# Patient Record
Sex: FEMALE | Race: WHITE | NOT HISPANIC OR LATINO | Employment: OTHER | URBAN - METROPOLITAN AREA
[De-identification: names, ages, dates, MRNs, and addresses within clinical notes are randomized per-mention and may not be internally consistent; named-entity substitution may affect disease eponyms.]

---

## 2017-01-03 NOTE — PATIENT INSTRUCTIONS

## 2017-01-03 NOTE — PROGRESS NOTES
SUBJECTIVE:                                                            Amada Hurd is a 76 year old female who presents for Preventive Visit  Are you in the first 12 months of your Medicare coverage?  No    Physical  Annual:     Getting at least 3 servings of Calcium per day::  Yes    Bi-annual eye exam::  Yes    Dental care twice a year::  Yes    Sleep apnea or symptoms of sleep apnea::  None    Diet::  Regular (no restrictions)    Frequency of exercise::  2-3 days/week    Duration of exercise::  30-45 minutes    Taking medications regularly::  Yes    Medication side effects::  None    Additional concerns today::  No      Medical letter of need- needs letter to use health care funds for massage and chiropractic care...  History- Broke sacral area many yrs ago, her 30s?  While skiing, chair-lift hit her in the back.    Fracture, no surgery for it, wasn't able to do any treatment to the area, just let it heal.  [facility]- regular doctor there, unsure if she saw a specialist.  Has had pain in that area since then, and has found it is best managed with regular massage and chiro for flares.  Does massage- 2x/month.  Chiro- as needed for flares.  Does yoga, stretching and massage to maintain.  Doing this for many years - best that she's found to lessen pain and keep active.  Did try PT - didn't really help.    Had dexa done- reviewed results- see TE discussion.  Will do mammogram- scheduled soon.  UTC on colonoscopy, pneumonia and zoster shots.  Had flu shot done.    Lipids- done over a year ago.  Can return fasting.    Patient Active Problem List    Diagnosis Date Noted     Raynaud disease      Priority: Medium     Menopausal syndrome (hot flashes) 10/19/2016     Priority: Medium     HRT txt with estrodiol patch- went on it many yrs ago (? After hysterectomy)- for hot flashes, depression, foggy brain- helped a lot.  Lowered dose likely in 10/14 when she met Dr. Jose.  Did have a hard time at first after  lowering dose, but has adjusted and is fine with this dose now.  Has tried off, for a couple wks- loses her energy, depressed when off the hormone.  Understands risks- would like to stay on the patch (1/17- refilled for a year).       Advance Care Planning 02/29/2016     Priority: Medium     Advance Care Planning 2/29/2016: Receipt of ACP document:  Received: Health Care Directive which was witnessed or notarized on 8/11/2014.  Document not previously scanned.  Validation form completed and sent with document to be scanned.  Code Status needs to be updated to reflect choices in most recent ACP document. Orders placed.  Confirmed/documented designated decision maker(s).  Added by Ghislaine Salazar             Sciatica      Priority: Medium     x 26 years/ spine clinic [facility]/ ortho       Lumbar disc disease      Priority: Medium     MRI L5 - S 1          Fractured coccyx (H)      Priority: Medium     Hyperlipidemia LDL goal <160 10/16/2014     Priority: Medium            COGNITIVE SCREEN  1) Repeat 3 items (Banana, Sunrise, Chair)    2) Clock draw: NORMAL  3) 3 item recall: Recalls 3 objects  Results: 3 items recalled: COGNITIVE IMPAIRMENT LESS LIKELY    Mini-CogTM Copyright S León. Licensed by the author for use in [facility]; reprinted with permission (mishel@.[email or web address]). All rights reserved.      All Histories reviewed and updated in EPIC as appropriate.  Social History   Substance Use Topics     Smoking status: Never Smoker      Smokeless tobacco: Never Used     Alcohol Use: 0.0 oz/week     0 Standard drinks or equivalent per week      Comment: 1 glass of wine- 2-3 times a week       3 times a week glass of wine       Today's PHQ-2 Score:   PHQ-2 ( 1999 Pfizer) 1/2/2017   Q1: Little interest or pleasure in doing things -   Q2: Feeling down, depressed or hopeless -   PHQ-2 Score -   Little interest or pleasure in doing things Not at all   Feeling down, depressed or hopeless Not at all   PHQ-2 Score 0     Do  you feel safe in your environment - Yes    Do you have a Health Care Directive?: Yes: Advance Directive has been received and scanned.    Current providers sharing in care for this patient include:   Patient Care Team:  Ekta Solo MD as PCP - General (Family Practice)      Hearing impairment: No    Ability to successfully perform activities of daily living: Yes, no assistance needed     Fall risk:  Fallen 2 or more times in the past year?: No  Any fall with injury in the past year?: No    Home safety:  none identified      The following health maintenance items are reviewed in Epic and correct as of today:  Health Maintenance   Topic Date Due     TETANUS IMMUNIZATION (SYSTEM ASSIGNED)  03/23/1958     MAMMO SCREEN Q2 YR (SYSTEM ASSIGNED)  10/25/2015     FALL RISK ASSESSMENT  12/11/2016     INFLUENZA VACCINE (SYSTEM ASSIGNED)  09/01/2017     COLON CANCER SCREEN (SYSTEM ASSIGNED)  10/20/2019     LIPID SCREEN Q5 YR FEMALE (SYSTEM ASSIGNED)  12/03/2020     ADVANCE DIRECTIVE PLANNING Q5 YRS (NO INBASKET)  02/28/2021     DEXA SCAN SCREENING (SYSTEM ASSIGNED)  Completed     PNEUMOCOCCAL  Completed       Pneumonia Vaccine: completed  Mammogram Screening: Patient over age 50, mutual decision to screen reflected in health maintenance.  History of abnormal Pap smear: NO - age 65 - see link Cervical Cytology Screening Guidelines     ROS:  Constitutional, HEENT, cardiovascular, pulmonary, gi and gu systems are negative, except as otherwise noted.    Problem list, Medication list, Allergies, and Medical/Social/Surgical histories reviewed in EPIC and updated as appropriate.  Labs reviewed in EPIC  BP Readings from Last 3 Encounters:   01/04/17 128/78   10/19/16 124/82   12/11/15 160/88    Wt Readings from Last 3 Encounters:   01/04/17 122 lb 3.2 oz (55.43 kg)   10/19/16 123 lb 4.8 oz (55.929 kg)   12/11/15 123 lb 6.4 oz (55.974 kg)                  Current Outpatient Prescriptions   Medication Sig Dispense Refill      "estradiol (VIVELLE-DOT) 0.075 MG/24HR BIW patch Place 1 patch onto the skin twice a week 24 patch 3     Multiple Vitamins-Minerals (PRESERVISION AREDS 2) CAPS        COENZYME Q-10 PO Take 100 mg by mouth       calcium carbonate (OS- MG Kashia. CA) 500 MG tablet Take 500 mg by mouth daily       estradiol (VIVELLE-DOT) 0.075 MG/24HR Place 1 patch onto the skin twice a week Generic ok Patient will call pharmacy when refill needed. 24 patch 3     aspirin (ANACIN) 81 MG EC tablet Take 81 mg by mouth daily       VITAMIN D, CHOLECALCIFEROL, PO Take 400 Units by mouth daily       diphenhydrAMINE-acetaminophen (TYLENOL PM EXTRA STRENGTH)  MG tablet Take 1 tablet by mouth At Bedtime       Allergies   Allergen Reactions     Codeine Nausea and Vomiting     Recent Labs   Lab Test  12/03/15   0942  10/16/14   1042 09/16/13   LDL  154*  154*  132   HDL  73  78  75   TRIG  97  60  82   ALT   --    --   15   CR  0.68  0.70  0.67   GFRESTIMATED  85  82  89   GFRESTBLACK  >90   GFR Calc    >90  102   POTASSIUM  3.9  3.7  4.6        OBJECTIVE:                                                            /78 mmHg  Pulse 68  Temp(Src) 97.7  F (36.5  C) (Oral)  Resp 14  Ht 5' 4\" (1.626 m)  Wt 122 lb 3.2 oz (55.43 kg)  BMI 20.97 kg/m2  Breastfeeding? No Estimated body mass index is 21.15 kg/(m^2) as calculated from the following:    Height as of 10/19/16: 5' 4\" (1.626 m).    Weight as of 10/19/16: 123 lb 4.8 oz (55.929 kg).  EXAM:   GENERAL: healthy, alert and no distress  EYES: Eyes grossly normal to inspection, PERRL and conjunctivae and sclerae normal  HENT: ear canals and TM's normal, nose and mouth without ulcers or lesions  NECK: no adenopathy, no asymmetry, masses, or scars and thyroid normal to palpation  RESP: lungs clear to auscultation - no rales, rhonchi or wheezes  BREAST: normal without masses, tenderness or nipple discharge and no palpable axillary masses or adenopathy  CV: regular rate " and rhythm, normal S1 S2, no S3 or S4, no murmur, click or rub, no peripheral edema and peripheral pulses strong  ABDOMEN: soft, nontender, no hepatosplenomegaly, no masses and bowel sounds normal  MS: no gross musculoskeletal defects noted, no edema  SKIN: no suspicious lesions or rashes  NEURO: Normal strength and tone, mentation intact and speech normal  PSYCH: mentation appears normal, affect normal/bright    ASSESSMENT / PLAN:                                                                ICD-10-CM    1. Encounter for routine adult medical exam with abnormal findings Z00.01 Basic metabolic panel  (Ca, Cl, CO2, Creat, Gluc, K, Na, BUN)   2. Menopausal syndrome (hot flashes) N95.1 estradiol (VIVELLE-DOT) 0.075 MG/24HR BIW patch     Basic metabolic panel  (Ca, Cl, CO2, Creat, Gluc, K, Na, BUN)   3. Hyperlipidemia LDL goal <160 E78.5 Lipid Profile with reflex to direct LDL     Basic metabolic panel  (Ca, Cl, CO2, Creat, Gluc, K, Na, BUN)   4. Closed fracture of coccyx, sequela S32.2XXS      CPE- Discussed diet, calcium and exercise.  Went over Self Breast Exam.  Thin prep pap was not done.  Eyes and Teeth or UTD or recommended f/u.  No immunizations needed today.  See orders below for tests ordered and screening needed.      HRT- pt feels much better on estradiol patch, understands risks, would like to remain on it.  Open to weaning trials every 2-3 yrs.  Refills for now sent in for a year.  Risks and benefits of medication(s) including potential side effects reviewed with patient.  Questions answered.     Lipids- pt not fasting- will rtc for fasting labs- will check with insurance to make sure there is not a lab co-pay cost.   Did discuss talking with her 's HR department if going on medicare for her would be better financially for her.    She has been surprised by visit costs after last visit.    H/o Coccyx fracture with persistent pain- well controlled with regular massage (2x/month) and chiropractic care  "for flares.  Letter written and given to pt today to help direct health care costs for these issues.    End of Life Planning:  Patient currently has an advanced directive: Yes.  Practitioner is supportive of decision.    COUNSELING:  Reviewed preventive health counseling, as reflected in patient instructions  Special attention given to:       Regular exercise       Healthy diet/nutrition       Osteoporosis Prevention/Bone Health       Colon cancer screening       (Rajwinder)menopause management       Advanced Planning     BP Screening:   Last 3 BP Readings:    BP Readings from Last 3 Encounters:   01/04/17 128/78   10/19/16 124/82   12/11/15 160/88       The following was recommended to the patient:  Re-screen BP within a year and recommended lifestyle modifications      Estimated body mass index is 21.15 kg/(m^2) as calculated from the following:    Height as of 10/19/16: 5' 4\" (1.626 m).    Weight as of 10/19/16: 123 lb 4.8 oz (55.929 kg).  Weight management plan noted, stable and monitoring   reports that she has never smoked. She has never used smokeless tobacco.      Appropriate preventive services were discussed with this patient, including applicable screening as appropriate for cardiovascular disease, diabetes, osteopenia/osteoporosis, and glaucoma.  As appropriate for age/gender, discussed screening for colorectal cancer, prostate cancer, breast cancer, and cervical cancer. Checklist reviewing preventive services available has been given to the patient.    Reviewed patients plan of care and provided an AVS. The Basic Care Plan (routine screening as documented in Health Maintenance) for Amada meets the Care Plan requirement. This Care Plan has been established and reviewed with the Patient.    Counseling Resources:  ATP IV Guidelines  Pooled Cohorts Equation Calculator  Breast Cancer Risk Calculator  FRAX Risk Assessment  ICSI Preventive Guidelines  Dietary Guidelines for Americans, 2010  USDA's MyPlate  ASA " Prophylaxis  Lung CA Screening    Ekta Solo MD  [facility]

## 2017-01-04 ENCOUNTER — OFFICE VISIT (OUTPATIENT)
Dept: FAMILY MEDICINE | Facility: CLINIC | Age: 77
End: 2017-01-04
Payer: COMMERCIAL

## 2017-01-04 VITALS
SYSTOLIC BLOOD PRESSURE: 128 MMHG | BODY MASS INDEX: 20.86 KG/M2 | DIASTOLIC BLOOD PRESSURE: 78 MMHG | WEIGHT: 122.2 LBS | HEART RATE: 68 BPM | HEIGHT: 64 IN | RESPIRATION RATE: 14 BRPM | TEMPERATURE: 97.7 F

## 2017-01-04 DIAGNOSIS — N95.1 MENOPAUSAL SYNDROME (HOT FLASHES): ICD-10-CM

## 2017-01-04 DIAGNOSIS — E78.5 HYPERLIPIDEMIA LDL GOAL <160: ICD-10-CM

## 2017-01-04 DIAGNOSIS — Z00.01 ENCOUNTER FOR ROUTINE ADULT MEDICAL EXAM WITH ABNORMAL FINDINGS: Primary | ICD-10-CM

## 2017-01-04 DIAGNOSIS — S32.2XXS CLOSED FRACTURE OF COCCYX, SEQUELA: ICD-10-CM

## 2017-01-04 PROCEDURE — 99397 PER PM REEVAL EST PAT 65+ YR: CPT | Performed by: FAMILY MEDICINE

## 2017-01-04 RX ORDER — ESTRADIOL 0.07 MG/D
1 FILM, EXTENDED RELEASE TRANSDERMAL
Qty: 24 PATCH | Refills: 3 | Status: SHIPPED | OUTPATIENT
Start: 2017-01-05 | End: 2018-01-08

## 2017-01-04 NOTE — NURSING NOTE
"Chief Complaint   Patient presents with     Wellness Visit     /78 mmHg  Pulse 68  Temp(Src) 97.7  F (36.5  C) (Oral)  Resp 14  Ht 5' 4\" (1.626 m)  Wt 122 lb 3.2 oz (55.43 kg)  BMI 20.97 kg/m2  Breastfeeding? No Estimated body mass index is 20.97 kg/(m^2) as calculated from the following:    Height as of this encounter: 5' 4\" (1.626 m).    Weight as of this encounter: 122 lb 3.2 oz (55.43 kg).  BP completed using cuff size: regular       Health Maintenance due pending provider review:  Mammogram    Pt has appt for MAMMO schedule-1/10/2016    Rica Rubi MA    "

## 2017-01-04 NOTE — Clinical Note
[facility]  [address], [address]  [place], [place] [address] [phone]     January 4, 2017     Amada CASAS Vickey                                                                                                                               [address] [address]        To whom it may concern,    Please note that patient is under my medical care.  She had an accident and fractured her sacral bone in her 30s.  She has had chronic pain in that area since then, but has successfully managed it with regular yoga, stretching, twice monthly massage and chiropractic care as needed for flares of the pain.  Please allow her to use HSA funds (or applicable program) for the massage and chiropractic treatments, and yoga classes if able.      Sincerely,          Ekta Solo MD      Clinic hours:  Monday   7:30 AM - 5:00 PM    Tuesday  7:00 AM - 7:00 PM    Wednesday  7:00 AM - 5:00 PM    Thursday  7:30 AM - 7:00 PM    Friday   7:30 AM - 5:00 PM

## 2017-01-04 NOTE — MR AVS SNAPSHOT
After Visit Summary   1/4/2017    Amada Hurd    MRN: [id number]           Patient Information     Date Of Birth          1940        Visit Information        Provider Department      1/4/2017 1:00 PM Ekta Solo MD [facility]        Today's Diagnoses     Encounter for routine adult medical exam with abnormal findings    -  1     Menopausal syndrome (hot flashes)         Hyperlipidemia LDL goal <160         Closed fracture of coccyx, sequela           Care Instructions      Preventive Health Recommendations    Female Ages 65 +    Yearly exam:     See your health care provider every year in order to  o Review health changes.   o Discuss preventive care.    o Review your medicines if your doctor has prescribed any.      You no longer need a yearly Pap test unless you've had an abnormal Pap test in the past 10 years. If you have vaginal symptoms, such as bleeding or discharge, be sure to talk with your provider about a Pap test.      Every 1 to 2 years, have a mammogram.  If you are over 69, talk with your health care provider about whether or not you want to continue having screening mammograms.      Every 10 years, have a colonoscopy. Or, have a yearly FIT test (stool test). These exams will check for colon cancer.       Have a cholesterol test every 5 years, or more often if your doctor advises it.       Have a diabetes test (fasting glucose) every three years. If you are at risk for diabetes, you should have this test more often.       At age 65, have a bone density scan (DEXA) to check for osteoporosis (brittle bone disease).    Shots:    Get a flu shot each year.    Get a tetanus shot every 10 years.    Talk to your doctor about your pneumonia vaccines. There are now two you should receive - Pneumovax (PPSV 23) and Prevnar (PCV 13).    Talk to your doctor about the shingles vaccine.    Talk to your doctor about the hepatitis B vaccine.    Nutrition:     Eat at least  5 servings of fruits and vegetables each day.      Eat whole-grain bread, whole-wheat pasta and brown rice instead of white grains and rice.      Talk to your provider about Calcium and Vitamin D.     Lifestyle    Exercise at least 150 minutes a week (30 minutes a day, 5 days a week). This will help you control your weight and prevent disease.      Limit alcohol to one drink per day.      No smoking.       Wear sunscreen to prevent skin cancer.       See your dentist twice a year for an exam and cleaning.      See your eye doctor every 1 to 2 years to screen for conditions such as glaucoma, macular degeneration and cataracts.        Follow-ups after your visit        Your next 10 appointments already scheduled     Song 10, 2017  1:00 PM   MA SCREENING DIGITAL BILATERAL with SHBCMA2   [facility]    [address] [place] [address], [address] [place] [id number] [phone]           Do not use any powder, lotion or deodorant under your arms or on your breast. If you do, we will ask you to remove it before your exam.  Wear comfortable, two-piece clothing.  If you have any allergies, tell your care team.  Bring any previous mammograms from other facilities or have them mailed to the breast center. This mammogram location, [facility], now offers 3D mammography. It doesn't replace a screening mammogram and can be done with a regular screening mammogram. It is optional and not all insurances will pay for it. 3D mammography is a special kind of mammogram that produces a three-dimensional image of the breast by using low dose-xrays. 3D allows the radiologist to see the breast tissue differently from 2D, which reduces the chance of repeat testing due to overlapping breast tissue. If you are interested in have a 3D mammogram, please check with your insurance before you arrive for your exam. On the day of your exam you will be asked if you would like 3D imaging.     "          Future tests that were ordered for you today     Open Future Orders        Priority Expected Expires Ordered    Lipid Profile with reflex to direct LDL Routine 1/25/2017 7/4/2017 1/4/2017    Basic metabolic panel  (Ca, Cl, CO2, Creat, Gluc, K, Na, BUN) Routine 1/25/2017 7/4/2017 1/4/2017            Who to contact     If you have questions or need follow up information about today's clinic visit or your schedule please contact [facility] directly at [phone].  Normal or non-critical lab and imaging results will be communicated to you by BetterWorkshart, letter or phone within 4 business days after the clinic has received the results. If you do not hear from us within 7 days, please contact the clinic through GameTubet or phone. If you have a critical or abnormal lab result, we will notify you by phone as soon as possible.  Submit refill requests through [employer] or call your pharmacy and they will forward the refill request to us. Please allow 3 business days for your refill to be completed.          Additional Information About Your Visit        BetterWorksharezeep Information     [employer] gives you secure access to your electronic health record. If you see a primary care provider, you can also send messages to your care team and make appointments. If you have questions, please call your primary care clinic.  If you do not have a primary care provider, please call [phone] and they will assist you.        Care EveryWhere ID     This is your Care EveryWhere ID. This could be used by other organizations to access your [facility] medical records  [id number]        Your Vitals Were     Pulse Temperature Respirations Height BMI (Body Mass Index) Breastfeeding?    68 97.7  F (36.5  C) (Oral) 14 5' 4\" (1.626 m) 20.97 kg/m2 No       Blood Pressure from Last 3 Encounters:   01/04/17 128/78   10/19/16 124/82   12/11/15 160/88    Weight from Last 3 Encounters:   01/04/17 122 lb 3.2 oz (55.43 kg)   10/19/16 123 lb 4.8 oz " (55.929 kg)   12/11/15 123 lb 6.4 oz (55.974 kg)                 Where to get your medicines      These medications were sent to [facility] - [place], [place] - [address] AT Portal to Registered [facility] Sites  [address], [facility] [address]     Phone:  [phone]    - estradiol 0.075 MG/24HR BIW patch       Primary Care Provider Office Phone # Fax #    Ekta Solo [phone]       [facility] [address]  [facility] [address]        Thank you!     Thank you for choosing [facility]  for your care. Our goal is always to provide you with excellent care. Hearing back from our patients is one way we can continue to improve our services. Please take a few minutes to complete the written survey that you may receive in the mail after your visit with us. Thank you!             Your Updated Medication List - Protect others around you: Learn how to safely use, store and throw away your medicines at www.disposemymeds.org.          This list is accurate as of: 1/4/17  1:53 PM.  Always use your most recent med list.                   Brand Name Dispense Instructions for use    ANACIN 81 MG EC tablet   Generic drug:  aspirin      Take 81 mg by mouth daily       calcium carbonate 500 MG tablet    OS- mg Tribe. Ca     Take 500 mg by mouth daily       COENZYME Q-10 PO      Take 100 mg by mouth       * estradiol 0.075 MG/24HR BIW patch    VIVELLE-DOT    24 patch    Place 1 patch onto the skin twice a week Generic ok Patient will call pharmacy when refill needed.       * estradiol 0.075 MG/24HR BIW patch   Start taking on:  1/5/2017    VIVELLE-DOT    24 patch    Place 1 patch onto the skin twice a week       PRESERVISION AREDS 2 Caps          TYLENOL PM EXTRA STRENGTH  MG tablet   Generic drug:  diphenhydrAMINE-acetaminophen      Take 1 tablet by mouth At Bedtime       VITAMIN D (CHOLECALCIFEROL) PO      Take 400 Units by  mouth daily       * Notice:  This list has 2 medication(s) that are the same as other medications prescribed for you. Read the directions carefully, and ask your doctor or other care provider to review them with you.

## 2017-03-08 ENCOUNTER — TELEPHONE (OUTPATIENT)
Dept: FAMILY MEDICINE | Facility: CLINIC | Age: 77
End: 2017-03-08

## 2017-03-08 NOTE — TELEPHONE ENCOUNTER
Summary:    Patient is due/failing the following:   MAMMOGRAM    Type of outreach:  Sent letter.  Action needed: mammogram    If need for provider review:    Please indicate OV, lab, MTM, or nurse appt if needed.  Indicate fasting or not fasting.                                                                                                                                          None  SOM Coyle CMA

## 2018-01-08 DIAGNOSIS — N95.1 MENOPAUSAL SYNDROME (HOT FLASHES): ICD-10-CM

## 2018-01-09 RX ORDER — ESTRADIOL 0.07 MG/D
FILM, EXTENDED RELEASE TRANSDERMAL
Qty: 24 PATCH | Refills: 3 | Status: SHIPPED | OUTPATIENT
Start: 2018-01-09 | End: 2018-01-12

## 2018-01-09 NOTE — TELEPHONE ENCOUNTER
"Routing refill request to provider for review/approval because:  Patient has upcoming appt with you   Uses mail order  Ok to refill for 3 months?  Per protocol can only refill for 1 month  Ashley MCPHERSON RN    Requested Prescriptions   Pending Prescriptions Disp Refills     estradiol (VIVELLE-DOT) 0.075 MG/24HR BIW patch [Pharmacy Med Name: ESTRADIOL(V) DIS 0.075/24] 24 patch 3     Sig: APPLY 1 PATCH TRANSDERMALLY2 TIMES A WEEK    Hormone Replacement Therapy Failed    1/8/2018  5:19 PM       Failed - Blood pressure on record and is less than 140/90 in past year    BP Readings from Last 3 Encounters:   01/04/17 128/78   10/19/16 124/82   12/11/15 160/88                Failed - Patient has mammogram in past 2 years on file if age 50-75       Passed - Recent or future visit with authorizing provider's specialty    Patient had office visit in the last year or has a visit in the next 30 days with authorizing provider.  See \"Patient Info\" tab in inbasket, or \"Choose Columns\" in Meds & Orders section of the refill encounter.              Passed - Patient is 18 years of age or older       Passed - No active pregnancy on record       Passed - No positive pregnancy test on record in past 12 months        Next 5 appointments (look out 90 days)     Jan 12, 2018 11:30 AM CST   PHYSICAL with Ekta Solo MD   [facility]    [address] [facility] [id number] [phone]                  "

## 2018-01-12 ENCOUNTER — OFFICE VISIT (OUTPATIENT)
Dept: FAMILY MEDICINE | Facility: CLINIC | Age: 78
End: 2018-01-12
Payer: COMMERCIAL

## 2018-01-12 VITALS
SYSTOLIC BLOOD PRESSURE: 127 MMHG | DIASTOLIC BLOOD PRESSURE: 63 MMHG | HEART RATE: 78 BPM | OXYGEN SATURATION: 100 % | HEIGHT: 64 IN | TEMPERATURE: 97.5 F | BODY MASS INDEX: 20.23 KG/M2 | WEIGHT: 118.5 LBS

## 2018-01-12 DIAGNOSIS — Z00.00 ENCOUNTER FOR ROUTINE ADULT HEALTH EXAMINATION WITHOUT ABNORMAL FINDINGS: Primary | ICD-10-CM

## 2018-01-12 DIAGNOSIS — M54.50 BILATERAL LOW BACK PAIN WITHOUT SCIATICA, UNSPECIFIED CHRONICITY: ICD-10-CM

## 2018-01-12 DIAGNOSIS — I73.00 RAYNAUD'S DISEASE WITHOUT GANGRENE: ICD-10-CM

## 2018-01-12 DIAGNOSIS — M53.3 SACRAL PAIN: ICD-10-CM

## 2018-01-12 DIAGNOSIS — E78.5 HYPERLIPIDEMIA LDL GOAL <160: ICD-10-CM

## 2018-01-12 DIAGNOSIS — N95.1 MENOPAUSAL SYNDROME (HOT FLASHES): ICD-10-CM

## 2018-01-12 PROCEDURE — 99397 PER PM REEVAL EST PAT 65+ YR: CPT | Performed by: FAMILY MEDICINE

## 2018-01-12 PROCEDURE — 82947 ASSAY GLUCOSE BLOOD QUANT: CPT | Performed by: FAMILY MEDICINE

## 2018-01-12 PROCEDURE — 80061 LIPID PANEL: CPT | Performed by: FAMILY MEDICINE

## 2018-01-12 PROCEDURE — 82306 VITAMIN D 25 HYDROXY: CPT | Performed by: FAMILY MEDICINE

## 2018-01-12 PROCEDURE — 36415 COLL VENOUS BLD VENIPUNCTURE: CPT | Performed by: FAMILY MEDICINE

## 2018-01-12 RX ORDER — ESTRADIOL 0.07 MG/D
FILM, EXTENDED RELEASE TRANSDERMAL
Qty: 24 PATCH | Refills: 3 | Status: SHIPPED | OUTPATIENT
Start: 2018-01-12 | End: 2019-01-18

## 2018-01-12 ASSESSMENT — ACTIVITIES OF DAILY LIVING (ADL)
CURRENT_FUNCTION: NO ASSISTANCE NEEDED
I_NEED_ASSISTANCE_FOR_THE_FOLLOWING_DAILY_ACTIVITIES:: NO ASSISTANCE IS NEEDED

## 2018-01-12 NOTE — MR AVS SNAPSHOT
After Visit Summary   1/12/2018    Amada Hurd    MRN: [id number]           Patient Information     Date Of Birth          1940        Visit Information        Provider Department      1/12/2018 11:30 AM Ekta Solo MD [facility]        Today's Diagnoses     Encounter for routine adult health examination without abnormal findings    -  1    Menopausal syndrome (hot flashes)        Hyperlipidemia LDL goal <160        Raynaud's disease without gangrene        Sacral pain        Bilateral low back pain without sciatica, unspecified chronicity           Follow-ups after your visit        Who to contact     If you have questions or need follow up information about today's clinic visit or your schedule please contact [facility] directly at [phone].  Normal or non-critical lab and imaging results will be communicated to you by Zeebohart, letter or phone within 4 business days after the clinic has received the results. If you do not hear from us within 7 days, please contact the clinic through Zeebohart or phone. If you have a critical or abnormal lab result, we will notify you by phone as soon as possible.  Submit refill requests through Snabboteket or call your pharmacy and they will forward the refill request to us. Please allow 3 business days for your refill to be completed.          Additional Information About Your Visit        MyChart Information     Snabboteket gives you secure access to your electronic health record. If you see a primary care provider, you can also send messages to your care team and make appointments. If you have questions, please call your primary care clinic.  If you do not have a primary care provider, please call [phone] and they will assist you.        Care EveryWhere ID     This is your Care EveryWhere ID. This could be used by other organizations to access your [facility] medical records  [id number]        Your Vitals Were     Pulse  "Temperature Height Pulse Oximetry Breastfeeding? BMI (Body Mass Index)    78 97.5  F (36.4  C) (Oral) 5' 3.75\" (1.619 m) 100% No 20.5 kg/m2       Blood Pressure from Last 3 Encounters:   01/12/18 127/63   01/04/17 128/78   10/19/16 124/82    Weight from Last 3 Encounters:   01/12/18 118 lb 8 oz (53.8 kg)   01/04/17 122 lb 3.2 oz (55.4 kg)   10/19/16 123 lb 4.8 oz (55.9 kg)              We Performed the Following     Glucose     Lipid panel reflex to direct LDL Fasting     Vitamin D Deficiency          Today's Medication Changes          These changes are accurate as of: 1/12/18 12:19 PM.  If you have any questions, ask your nurse or doctor.               These medicines have changed or have updated prescriptions.        Dose/Directions    estradiol 0.075 MG/24HR BIW patch   Commonly known as:  VIVELLE-DOT   This may have changed:  See the new instructions.   Used for:  Menopausal syndrome (hot flashes)   Changed by:  Ekta Solo MD        APPLY 1 PATCH TRANSDERMALLY2 TIMES A WEEK   Quantity:  24 patch   Refills:  3            Where to get your medicines      [facility] [address] AT [address] to [place] [address] [address] [facility], [facility] [address]     Phone:  [phone]     estradiol 0.075 MG/24HR BIW patch                Primary Care Provider Office Phone # Fax #    Ekta Solo [phone] [address] [address]        Equal Access to Services     [facility] : Hadletitia Arredondo, briana lujan, qaybmary bowmanalmatilde guerrier. So [facility] [phone].    ATENCIÓN: Si habla español, tiene a bradford disposición servicios gratuitos de asistencia lingüística. David al [phone].    We comply with applicable federal civil rights laws and [place] laws. We do not discriminate on the basis of race, color, national " origin, age, disability, sex, sexual orientation, or gender identity.            Thank you!     Thank you for choosing [facility]  for your care. Our goal is always to provide you with excellent care. Hearing back from our patients is one way we can continue to improve our services. Please take a few minutes to complete the written survey that you may receive in the mail after your visit with us. Thank you!             Your Updated Medication List - Protect others around you: Learn how to safely use, store and throw away your medicines at www.disposemymeds.org.          This list is accurate as of: 1/12/18 12:19 PM.  Always use your most recent med list.                   Brand Name Dispense Instructions for use Diagnosis    ANACIN 81 MG EC tablet   Generic drug:  aspirin      Take 81 mg by mouth daily        calcium carbonate 1250 MG tablet    OS- mg [place]. [place]     Take 500 mg by mouth daily        COENZYME Q-10 PO      Take 100 mg by mouth        estradiol 0.075 MG/24HR BIW patch    VIVELLE-DOT    24 patch    APPLY 1 PATCH TRANSDERMALLY2 TIMES A WEEK    Menopausal syndrome (hot flashes)       PRESERVISION AREDS 2 Caps           TYLENOL PM EXTRA STRENGTH  MG tablet   Generic drug:  diphenhydrAMINE-acetaminophen      Take 1 tablet by mouth At Bedtime        VITAMIN D (CHOLECALCIFEROL) PO      Take 400 Units by mouth daily

## 2018-01-12 NOTE — LETTER
[facility]  [address]  [address]  [place], [place] [address] [phone]    January 12, 2018    RE:  Amada CASAS Vickey                                                                                                                          [address]  [facility] [address]            To whom it may concern:    Please note that patient is under my medical care.  She had an accident and fractured her sacral bone in her 30s.  She has had chronic pain in that area since then, but has successfully managed it with regular yoga, stretching, twice monthly massage and chiropractic care as needed for flares of the pain.  Please allow her to use HSA funds (or applicable program) for the massage and chiropractic treatments, and yoga classes if able.      Sincerely,        Ekta Solo MD      Clinic hours:  Monday 7:30 AM - 5:00 PM    Tuesday  7:00 AM - 7:00 PM    Wednesday  7:00 AM - 5:00 PM    Thursday  7:30 AM -  7:00 PM    Friday   7:30 AM -  5:00 PM

## 2018-01-12 NOTE — PROGRESS NOTES
SUBJECTIVE:   Amada Hurd is a 77 year old female who presents for Preventive Visit.    Are you in the first 12 months of your Medicare coverage?  No    Physical   Annual:     Getting at least 3 servings of Calcium per day::  Yes    Bi-annual eye exam::  Yes    Dental care twice a year::  Yes    Sleep apnea or symptoms of sleep apnea::  None    Diet::  Regular (no restrictions)    Frequency of exercise::  2-3 days/week    Duration of exercise::  Less than 15 minutes    Taking medications regularly::  Yes    Medication side effects::  Other    Additional concerns today::  YES    Ability to successfully perform activities of daily living: no assistance needed  Home Safety:  No safety concerns identified  Hearing Impairment: no hearing concerns    Fall risk:  Fallen 2 or more times in the past year?: No  Any fall with injury in the past year?: No    COGNITIVE SCREEN  1) Repeat 3 items (Banana, Sunrise, Chair)    2) Clock draw: NORMAL  3) 3 item recall: Recalls 1 object   Results: NORMAL clock, 1-2 items recalled: COGNITIVE IMPAIRMENT LESS LIKELY    Mini-CogTM Copyright S León. Licensed by the author for use in [employer]; reprinted with permission (mishel@[facility]). All rights reserved.      Concerns-  Wondering about labs?  -Lipids- is fasting. Will check.  -Hgb, B12, creatinine- no fatigue or other issues, eats variety of meats.  Likely doesn't need a check.  -Vit D?  Does take 4000 units Vit D daily, plus calcium (1/d) and MVI (1/d)- could be getting to much, will check.    HRT- pt continues on vivelle-dot patch- really helps her mood, frame of mind.  Wants to continue on it very much.  Replaces it 2x/wk, can tell when she's getting to the end of the dose- feels depressed and down in the dumps.    Did try anti-depressants (she thinks) once a long time ago, was awful.  At ob/gyn clinic, where she had her hysterectomy- in her 30s.  Didn't go on hormones right away- also horrible.    Chronic low  back/sacral pain - since sacral fx many yrs ago-  Would like letter again for massage (goes 2x/month- keeps sacral and low back pain away if she does it regular), and sometimes chiro and sometimes acupncture.  Allows her to use her HSA money for it.  She's also doing yoga 3-4x/wk and treadmill 3-4x/wk.  Sx's also worsen if she's not regular.    Reviewed and updated as needed this visit by clinical staff  Tobacco  Allergies  Meds  Problems       Reviewed and updated as needed this visit by Provider  Allergies  Meds  Problems        Social History   Substance Use Topics     Smoking status: Never Smoker     Smokeless tobacco: Never Used     Alcohol use 0.0 oz/week     0 Standard drinks or equivalent per week      Comment: 1 glass of wine- 2-3 times a week       Alcohol Use 1/12/2018   If you drink alcohol, do you typically have greater than 3 drinks per day OR greater than 7 drinks per week?   Yes   AUDIT SCORE  2         Today's PHQ-2 Score:   PHQ-2 ( 1999 Pfizer) 1/12/2018   Q1: Little interest or pleasure in doing things 0   Q2: Feeling down, depressed or hopeless 0   PHQ-2 Score 0   Q1: Little interest or pleasure in doing things Not at all   Q2: Feeling down, depressed or hopeless Not at all   PHQ-2 Score 0       Do you feel safe in your environment - Yes    Do you have a Health Care Directive?: Yes: Patient states has Advance Directive and will bring in a copy to clinic.    Current providers sharing in care for this patient include:   Patient Care Team:  Ekta Solo MD as PCP - General (Family Practice)    The following health maintenance items are reviewed in Epic and correct as of today:  Health Maintenance   Topic Date Due     TETANUS IMMUNIZATION (SYSTEM ASSIGNED)  03/23/1958     FALL RISK ASSESSMENT  01/04/2018     COLON CANCER SCREEN (SYSTEM ASSIGNED)  10/20/2019     LIPID SCREEN Q5 YR FEMALE (SYSTEM ASSIGNED)  12/03/2020     ADVANCE DIRECTIVE PLANNING Q5 YRS  02/28/2021     INFLUENZA  VACCINE (SYSTEM ASSIGNED)  Completed     DEXA SCAN SCREENING (SYSTEM ASSIGNED)  Completed     PNEUMOCOCCAL  Completed     Labs reviewed in EPIC  BP Readings from Last 3 Encounters:   01/12/18 127/63   01/04/17 128/78   10/19/16 124/82    Wt Readings from Last 3 Encounters:   01/12/18 118 lb 8 oz (53.8 kg)   01/04/17 122 lb 3.2 oz (55.4 kg)   10/19/16 123 lb 4.8 oz (55.9 kg)                  Patient Active Problem List   Diagnosis     Hyperlipidemia LDL goal <160     Sciatica     Lumbar disc disease     Fractured coccyx (H)     Advance Care Planning     Menopausal syndrome (hot flashes)     Raynaud disease     Past Surgical History:   Procedure Laterality Date     APPENDECTOMY       CYSTOCELE REPAIR       HYSTERECTOMY      ? part of left ovary in     REPAIR BLADDER         Social History   Substance Use Topics     Smoking status: Never Smoker     Smokeless tobacco: Never Used     Alcohol use 0.0 oz/week     0 Standard drinks or equivalent per week      Comment: 1 glass of wine- 2-3 times a week     Family History   Problem Relation Age of Onset     CANCER       Hypertension Father      Alzheimer Disease           Current Outpatient Prescriptions   Medication Sig Dispense Refill     estradiol (VIVELLE-DOT) 0.075 MG/24HR BIW patch APPLY 1 PATCH TRANSDERMALLY2 TIMES A WEEK 24 patch 3     Multiple Vitamins-Minerals (PRESERVISION AREDS 2) CAPS        COENZYME Q-10 PO Take 100 mg by mouth       calcium carbonate (OS- MG [place]. CA) 500 MG tablet Take 500 mg by mouth daily       aspirin (ANACIN) 81 MG EC tablet Take 81 mg by mouth daily       VITAMIN D, CHOLECALCIFEROL, PO Take 400 Units by mouth daily       diphenhydrAMINE-acetaminophen (TYLENOL PM EXTRA STRENGTH)  MG tablet Take 1 tablet by mouth At Bedtime       Allergies   Allergen Reactions     Codeine Nausea and Vomiting     Recent Labs   Lab Test  01/12/18   1233  12/03/15   0942  10/16/14   1042 09/16/13   LDL  156*  154*  154*  132   HDL  85  73  78  75  "  TRIG  106  97  60  82   ALT   --    --    --   15   CR   --   0.68  0.70  0.67   GFRESTIMATED   --   85  82  89   GFRESTBLACK   --   >90   GFR Calc    >90  102   POTASSIUM   --   3.9  3.7  4.6            Review of Systems  Constitutional, HEENT, cardiovascular, pulmonary, GI, , musculoskeletal, neuro, skin, endocrine and psych systems are negative, except as otherwise noted.      OBJECTIVE:   /63  Pulse 78  Temp 97.5  F (36.4  C) (Oral)  Ht 5' 3.75\" (1.619 m)  Wt 118 lb 8 oz (53.8 kg)  SpO2 100%  Breastfeeding? No  BMI 20.5 kg/m2 Estimated body mass index is 20.5 kg/(m^2) as calculated from the following:    Height as of this encounter: 5' 3.75\" (1.619 m).    Weight as of this encounter: 118 lb 8 oz (53.8 kg).  Physical Exam  GENERAL APPEARANCE: healthy, alert and no distress  EYES: Eyes grossly normal to inspection, PERRL and conjunctivae and sclerae normal  HENT: ear canals and TM's normal, nose and mouth without ulcers or lesions, oropharynx clear and oral mucous membranes moist  NECK: no adenopathy, no asymmetry, masses, or scars and thyroid normal to palpation  RESP: lungs clear to auscultation - no rales, rhonchi or wheezes  BREAST: normal without masses, tenderness or nipple discharge and no palpable axillary masses or adenopathy  CV: regular rate and rhythm, normal S1 S2, no S3 or S4, no murmur, click or rub, no peripheral edema and peripheral pulses strong  ABDOMEN: soft, nontender, no hepatosplenomegaly, no masses and bowel sounds normal  MS: no musculoskeletal defects are noted and gait is age appropriate without ataxia  SKIN: no suspicious lesions or rashes  NEURO: Normal strength and tone, sensory exam grossly normal, mentation intact and speech normal  PSYCH: mentation appears normal and affect normal/bright    ASSESSMENT / PLAN:       ICD-10-CM    1. Encounter for routine adult health examination without abnormal findings Z00.00 Glucose     Vitamin D Deficiency   2. " Menopausal syndrome (hot flashes) N95.1 estradiol (VIVELLE-DOT) 0.075 MG/24HR BIW patch   3. Hyperlipidemia LDL goal <160 E78.5 Lipid panel reflex to direct LDL Fasting   4. Raynaud's disease without gangrene I73.00    5. Sacral pain M53.3    6. Bilateral low back pain without sciatica, unspecified chronicity M54.5      CPE- Discussed diet, calcium and exercise.  Went over Self Breast Exam.  Thin prep pap was not done.  Eyes and Teeth or UTD or recommended f/u.  No immunizations needed today.  See orders below for tests ordered and screening needed.      HRT/menopause- pt with full hysterectomy around age 32.  Wasn't initially put on hormones she says.  Tried 'something awful' when they did, maybe mood med, finally landed on vivelle dot and really does not want to try off again.  Feels dip in mood prior to switching to second patch for the week.  Had long discussion about the risks of taking hormones after a few yrs, and when older.  She will continue to think about it, but for now, feels she understands the risks, and would like to stay on it.  Risks and benefits of medication(s) including potential side effects reviewed with patient.  Questions answered.  Refills sent for a year.    Lipids- fasting today, will recheck lipids and glucose.  Vit D- takes likely ~5000 units/day- will check levels and see if that is okay.    Sacral/low back pain- since injury many yrs ago, sacral area fx.  Feels better with regular massage, sometimes chiro/acupuncture.  Letter written again to use HSA funds.      End of Life Planning:  Patient currently has an advanced directive: Yes.  Practitioner is supportive of decision.    COUNSELING:  Reviewed preventive health counseling, as reflected in patient instructions    BP Screening:   Last 3 BP Readings:    BP Readings from Last 3 Encounters:   01/12/18 127/63   01/04/17 128/78   10/19/16 124/82       The following was recommended to the patient:  Re-screen BP within a year and  "recommended lifestyle modifications    Estimated body mass index is 20.5 kg/(m^2) as calculated from the following:    Height as of this encounter: 5' 3.75\" (1.619 m).    Weight as of this encounter: 118 lb 8 oz (53.8 kg).     reports that she has never smoked. She has never used smokeless tobacco.        Appropriate preventive services were discussed with this patient, including applicable screening as appropriate for cardiovascular disease, diabetes, osteopenia/osteoporosis, and glaucoma.  As appropriate for age/gender, discussed screening for colorectal cancer, prostate cancer, breast cancer, and cervical cancer. Checklist reviewing preventive services available has been given to the patient.    Reviewed patients plan of care and provided an AVS. The Basic Care Plan (routine screening as documented in Health Maintenance) for Amada meets the Care Plan requirement. This Care Plan has been established and reviewed with the Patient.    Counseling Resources:  ATP IV Guidelines  Pooled Cohorts Equation Calculator  Breast Cancer Risk Calculator  FRAX Risk Assessment  ICSI Preventive Guidelines  Dietary Guidelines for Americans, 2010  USDA's MyPlate  ASA Prophylaxis  Lung CA Screening    Ekta Solo MD  [facility]  "

## 2018-01-13 LAB
CHOLEST SERPL-MCNC: 262 MG/DL
GLUCOSE SERPL-MCNC: 89 MG/DL (ref 70–99)
HDLC SERPL-MCNC: 85 MG/DL
LDLC SERPL CALC-MCNC: 156 MG/DL
NONHDLC SERPL-MCNC: 177 MG/DL
TRIGL SERPL-MCNC: 106 MG/DL

## 2018-01-15 LAB — DEPRECATED CALCIDIOL+CALCIFEROL SERPL-MC: 60 UG/L (ref 20–75)

## 2018-01-16 NOTE — PROGRESS NOTES
Here are your lab results from your recent visit...  -Your Vitamin D is in a good range, so you can continue on your current supplementation (though may want to back down some in the summer).  -Your glucose is in the normal range at 89 (<100 is normal), so there is no sign of diabetes or pre-diabetes.   -Your cholesterol panel looks abnormal with a high LDL (the bad cholesterol), but a very good HDL (the good cholesterol).  Running the numbers through the guidelines, you are estimated to have a risk of 16.3% chance of having a cardivascular event in the next 10 years (and we recommend starting a statin medication if your risk is more than 5-7.5%).  I would come back in to discuss the pros/cons/risks of taking a statin medication (like lipitor, crestor, simvastatin).      Please let me know if you have any questions.  Mark Persaud MD

## 2018-02-12 ENCOUNTER — TELEPHONE (OUTPATIENT)
Dept: FAMILY MEDICINE | Facility: CLINIC | Age: 78
End: 2018-02-12

## 2018-02-12 NOTE — TELEPHONE ENCOUNTER
Received form(s) from [employer] for Physician result form.  Placed form(s) in/on CW's box.  Forms need to be signed and mailed..    Call pt to verify form was sent: No  Copy needs to be sent for scanning after completion: Yes

## 2018-05-15 ENCOUNTER — TRANSFERRED RECORDS (OUTPATIENT)
Dept: HEALTH INFORMATION MANAGEMENT | Facility: CLINIC | Age: 78
End: 2018-05-15

## 2018-05-16 ENCOUNTER — OFFICE VISIT (OUTPATIENT)
Dept: FAMILY MEDICINE | Facility: CLINIC | Age: 78
End: 2018-05-16
Payer: COMMERCIAL

## 2018-05-16 VITALS
BODY MASS INDEX: 20.09 KG/M2 | HEART RATE: 85 BPM | RESPIRATION RATE: 16 BRPM | SYSTOLIC BLOOD PRESSURE: 141 MMHG | TEMPERATURE: 97.9 F | WEIGHT: 116.1 LBS | DIASTOLIC BLOOD PRESSURE: 71 MMHG | OXYGEN SATURATION: 99 %

## 2018-05-16 DIAGNOSIS — F41.0 PANIC ATTACK: Primary | ICD-10-CM

## 2018-05-16 DIAGNOSIS — E78.5 HYPERLIPIDEMIA LDL GOAL <160: ICD-10-CM

## 2018-05-16 DIAGNOSIS — H33.21 DETACHED RETINA, RIGHT: ICD-10-CM

## 2018-05-16 PROCEDURE — 99215 OFFICE O/P EST HI 40 MIN: CPT | Performed by: FAMILY MEDICINE

## 2018-05-16 RX ORDER — HYDROXYZINE PAMOATE 25 MG/1
25 CAPSULE ORAL 3 TIMES DAILY PRN
Qty: 90 CAPSULE | Refills: 1 | Status: SHIPPED | OUTPATIENT
Start: 2018-05-16

## 2018-05-16 NOTE — NURSING NOTE
"Chief Complaint   Patient presents with     Lipids     Initial /71  Pulse 85  Temp 97.9  F (36.6  C) (Tympanic)  Resp 16  Wt 116 lb 1.6 oz (52.7 kg)  SpO2 99%  BMI 20.09 kg/m2 Estimated body mass index is 20.09 kg/(m^2) as calculated from the following:    Height as of 1/12/18: 5' 3.75\" (1.619 m).    Weight as of this encounter: 116 lb 1.6 oz (52.7 kg).  BP completed using cuff size: regular. L arm       Health Maintenance that is potentially due pending provider review:   NONE    n/a       Senait Nova CMA     "

## 2018-05-16 NOTE — PATIENT INSTRUCTIONS
Panic attacks-  Take the vistaril 25mg in the morning.  You can take two more during the day.  Take them if/when you get a panic attack- right away.  If that is not working well, take them - one in morning, one at noon, and one in the afternoon.      Cholesterol Medicines  Your healthcare provider has prescribed medicine to help control your cholesterol. This sheet tells you how cholesterol affects your health. It also explains how medicines can help improve your cholesterol levels.  Understanding cholesterol  Cholesterol is a type of fat (lipid) that s carried in the blood. Your body makes cholesterol in the liver. You also get it from certain foods. Your body needs some cholesterol to stay healthy. But high cholesterol makes more plaque build up in blood vessels. Plaque is a fatty substance. Over time, this plaque narrows and hardens the blood vessels. This reduces or blocks blood flow in these vessels and raises your risk for heart attack (acute myocardial infarction, or AMI), stroke, and other health problems. This is known as atherosclerotic cardiovascular disease (ASCVD).  Types of lipids  Your blood has 3 key fats:    LDL (low-density lipoprotein) cholesterol. This is called  bad  because it can cause plaque to build up in the blood vessels.    HDL (high-density lipoprotein) cholesterol. This is called  good  because it helps get rid of harmful cholesterol in the bloodstream.    Triglycerides. Your body uses this form of fat to store energy. Like LDL cholesterol, this fat can cause plaque to build up in the blood vessels.  Healthy cholesterol levels  You can find out your levels by having a blood test. High blood cholesterol is a risk factor for getting ASCVD. Talk to your healthcare provider about what levels are best for you. To find out more about cholesterol levels, visit www.heart.org/cholesterol.  You may need your cholesterol levels checked at regular intervals. This is to see if you are meeting the  cholesterol goals you and your provider have agreed on. Make sure you know what this schedule will be and how to get ready for this test.   Types of cholesterol medicines    Medicines can help control the amount of cholesterol in the blood. There are several types. Each controls cholesterol in a different way. They also have different effects in terms of how well they work to lower cholesterol and reduce death rates. Discuss these effects with your healthcare provider. Your healthcare provider will prescribe the type of medicine that is best for you. Medicines may be used alone or combined. The main types are:    Statins (HMG-CoA reductase inhibitors). Statins are thought to be the best at lowering cholesterol. They do this by keeping your body from making cholesterol. Benefits: Statins lower LDL cholesterol. They also slightly raise HDL cholesterol and lower triglycerides.    Selective cholesterol absorption inhibitors. These prevent your body from taking cholesterol from food. They may be prescribed to use alone or with a statin. Benefits: These medicines lower LDL cholesterol. They also slightly raise HDL cholesterol and lower triglycerides.    Resins (bile acid sequestrants or bile acid-binding medicines). Resins help you get rid of cholesterol through the intestines. They work by binding to bile. Bile is a substance that helps the body digest food. Your body uses cholesterol to make bile. Normally, most bile is absorbed by the body during digestion. But when bile is bound to resin, it is eliminated from the body. So the body must make more bile. To do this, the body takes up more cholesterol from the blood. Benefits: Resins lower LDL cholesterol.    Fibrates (fibric acid derivatives). These are best at cutting back on how much triglycerides your body makes. They don t work well to lower LDL. Benefits: Fibrates lower triglycerides. They also raise HDL cholesterol.    Niacin (nicotinic acid). Niacin (vitamin B-3)  affects how the liver makes blood fats. But don t use over-the-counter niacin for cholesterol problems. It isn t regulated by the FDA. Benefits: Niacin raises HDL cholesterol. It also lowers triglycerides and LDL cholesterol.    Omega-3 fatty acids. These reduce the amount of triglycerides your body makes. They also help to clear these lipids from the blood. Omega-3 fatty acids are found in many foods. These include salmon and other oily fish, and walnuts. Your healthcare provider may prescribe these fatty acids in capsule form. Benefits: Omega-3s lower triglycerides. (Note: They may increase LDL cholesterol in some patients.)     PCSK9 inhibitors. These medicines lower LDL cholesterol levels by breaking down the chemicals in the liver that control making LDL cholesterol. These medicines are given by an injection. They are used for people who have an inherited form of high cholesterol (familial hypercholesterolemia). They are also for people who have a hard time controlling their cholesterol with other medicines.  High-risk groups  Some people may need to take medicines called statins to control their cholesterol. This is in addition to eating a healthy diet and getting regular exercise.  Statins can help you stay healthy. They can also help prevent a heart attack or stroke. You may need to take a statin if you are in one of these groups:    Adults who have had a heart attack or stroke. Or adults who have had peripheral vascular disease, a ministroke (transient ischemic attack), or stable or unstable angina. This group also includes people who have had a procedure to restore blood flow through a blocked artery. These procedures include percutaneous coronary intervention, angioplasty, stent, and open-heart bypass surgery.    Adults who have diabetes. Or adults who are at higher risk of having a heart attack or stroke and have an LDL cholesterol level of 70 to 189 mg/dL    Adults who are 21 years old or older and have  an LDL cholesterol level of 190 mg/dL or higher.  If you are in a high-risk group, talk with your healthcare provider about your treatment goals. Make sure you understand why these goals are important, based on your own health history and your family history of heart disease or high cholesterol.  Make a plan to have regular cholesterol checks. You may need to fast before getting this test. Also ask your provider about any side effects your medicines may cause. Let your provider know about any side effects you have. You may need to take more than one medicine to reach the cholesterol goals that you and your provider decide on.  Taking cholesterol medicines  Take your medication exactly as your healthcare provider tells you to. This will help it work best. Here are tips for taking cholesterol medicines:    Know when and how to take your medicines. Some may need to be taken with food. Others may need to be taken on an empty stomach or at a certain time of day.    Stick to a schedule. Try the following:  ? Don t skip doses or stop taking your medicine. This is important even if you feel better or if your cholesterol numbers improve.  ? Set things up to help you remember. For instance, work taking your medicines into your routine. You could plan to take them when you get up in the morning or when you go to bed at night.  ? Keep track of what you take. You may take a few different medicines. If so, a list or chart can help you take the right pills at the right time. A pillbox with days of the week or times of day is also a good tool for keeping track.    Prevent medicine interactions. Some medicines and supplements can interact with one another. This means they affect how other medicines work when taken together. Be sure to tell your healthcare provider about all other medicines you take. This includes vitamins, herbs, and over-the-counter medicines.    Know how to deal with side effects. Many people have side effects  when they first start taking a medicine. These are things like headache and stomach upset. Side effects should go away in a few weeks. Tell your healthcare provider about any side effects you have. Certain side effects should be reported to your healthcare provider right away. These include yellowing of the eyes and blurred vision. Also report muscle aches and breathing problems.  If you are pregnant or breastfeeding, tell your health care provider before taking any cholesterol medicines.  A healthy lifestyle  Your healthcare provider will help you make changes your lifestyle if needed. These changes are needed to help you lower your cholesterol and keep the ASCVD from getting worse. Things you may need to work on are:  Diet  Your healthcare provider will give you information on changes that you may need to make to your diet. You may need to see a registered dietitian or nutritionist for help with these changes. You might be asked to:    Eat less meat containing saturated fat and high levels of cholesterol    Eat less sodium (salt), especially if you have high blood pressure    Eat more fresh vegetables and fruits    Eat lean protein, like fish, chicken and turkey, and beans and peas    Eat less processed meats, like deli meats, sausage, and pepperoni    Choose non-fat and low-fat milk, yogurt and cheese    Use vegetable and nut oils instead of butter, shortening, and margarine    Limit sweets and packaged foods, like chips, cookies, and baked goods    Limit eating out at restaurants and eating fast foods  Physical activity  Your healthcare provider may ask you to be more active. Depending on your health, your provider may recommend that you get moderate to vigorous intensity exercise for at least 40 minutes each day, 3 to 4 days each week. Some examples of moderate to vigorous exercise are:    Walking at a brisk pace, about 3-4 miles per hour    Jogging or running    Riding a bicycle or stationary bike    Swimming  or water aerobics    Dancing    Hiking    Martial arts    Tennis  You may not be able to do 40 minutes right away. You may have to start with 5 to 10 minutes a day, 2 times a day, and then keep adding to it until you can do 40 minutes.  Weight management  If you are overweight or obese, your healthcare provider may tell you to lose weight and lower your BMI (body mass index). Changing your diet and getting more exercise can help. Controlling the number of calories you eat is the best way to lose weight.  Smoking  If you smoke, quit now. Ask your health care provider for information on medications that can help you fight cravings. Enroll in a stop-smoking program to improve your chances of success. Ask your provider for smoking-cessation referrals and products.  Stress  Learn ways to help you deal with stress in your home and work life. Here are a few ideas:    Take a yoga class. You can find classes at local community centers or on the Internet.    Get regular exercise. Exercise helps your mind let go of problems and release stress in your muscles.    Deep breathing. Take a few minutes several times a day to just sit quietly and breathe. Concentrate on the air going into and out of your body.    Talk with loved ones. Take a few minutes each day to connect with people that make you feel supported.  Date Last Reviewed: 2/1/2017 2000-2017 [employer]. [address] [place] [address], [place] [address]. All rights reserved. This information is not intended as a substitute for professional medical care. Always follow your healthcare professional's instructions.        Understanding Panic Disorder (Panic Attack)  A panic attack is a sudden, intense fear that lasts for several minutes when there is no real danger. With it comes terror, physical symptoms, and a strong need to escape from wherever you are. If you have these attacks often, you have panic disorder. The attacks can be very frightening. You may be scared  of having another one. You may even stay away from a place where you ve had an attack. Some people become so afraid of having panic attacks, it s very hard for them to leave home.  Before accepting a diagnosis of panic disorder, it s important to see your healthcare provider. Your provider will evaluate your symptoms to make sure you don t have another health condition that is causing the symptoms. Conditions that can cause panic symptoms include certain heart and lung conditions and thyroid disease. Other things can also cause panic attack symptoms. These include having too much caffeine or using other stimulants, such as certain medicines.    What does a panic attack feel like?  Most panic attacks start suddenly, for no clear reason. They last about 5 to 20 minutes. A panic attack can start at any time, even while you re sleeping. During the attack, you may have:    A sudden surge of anxiety, as if you just missed hitting someone with your car. But with a panic attack, you re anxious for no clear reason    Physical symptoms, such as sweating, shortness of breath, a pounding heart, trembling, feeling like you re choking, chest pain, nausea, or dizziness    A fear that you re having a heart attack, dying, or about to lose control    A feeling that things happening around you are not real  What to do during a panic attack    Remind yourself that your body is having a false alarm. Nothing bad will happen to you. You ve survived attacks before, and you will this time, too.    Don t fight your feelings. Let them come. Ride them out. Focus on a task like counting backward from 100. Think about some place relaxing, such as a tropical island or quiet meadow. Ask your healthcare provider or therapist to suggest other relaxation techniques.    If your symptoms worsen or occur more often, see your healthcare provider.  Help to overcome the fear  Fear of a panic attack can make you miserable. But with professional support and  self-monitoring strategies, this fear can be managed. Ask your healthcare provider or therapist for help. Remember these tips:    Keep in mind that places and activities don t cause attacks. Separate the attack from the situation. Make an effort not to avoid the situation in the future.    Don t give in to the temptation to use alcohol or unprescribed medicines as an escape. In the long run, they will only add to your problems.   Warning signs for suicide  Panic disorders can be a discouraging, frightening condition that can lead some people to consider self-harm or suicide. It is very important to work with a trusted therapist, take any medicines as prescribed, and seek help if you have any of these symptoms:    Thinking often about taking your life    Planning how you may try to end your life.    Talking or writing about committing suicide    Feeling that death is the only solution to your problems    Feeling a pressing need to make out your will or arrange your     Giving away things you own    Participating in risky behaviors, such as sex with someone you don't know or drinking and driving  If you notice any of these warning signs, get help right away. You can call a mental health clinic, a 24-hour suicide crisis hotline, or go to a hospital emergency room.If there is an immediate risk, call 911.   Other resources    National Suicide Prevention Lifeline  130.289.4519 (468-752-XGAM)  www.suicidepreventionlifeline.org    National Suicide Hotline  941.527.1367 (800-SUICIDE)  suicidepreventionlifeline.org    National [place] [employer] (VLADIMIR)  262.972.2701  www.vladimir.org    [facility]  727.610.4126  www.[facility].org  Date Last Reviewed: 2017-2017 [employer]. [address] [facility], [place] [address]. All rights reserved. This information is not intended as a substitute for professional medical care. Always follow your healthcare professional's instructions.

## 2018-05-16 NOTE — MR AVS SNAPSHOT
After Visit Summary   5/16/2018    Amada Hurd    MRN: [id number]           Patient Information     Date Of Birth          1940        Visit Information        Provider Department      5/16/2018 11:30 AM Ekta Sloo MD [facility]        Today's Diagnoses     Panic attack    -  1    Detached retina, right          Care Instructions      Panic attacks-  Take the vistaril 25mg in the morning.  You can take two more during the day.  Take them if/when you get a panic attack- right away.  If that is not working well, take them - one in morning, one at noon, and one in the afternoon.      Cholesterol Medicines  Your healthcare provider has prescribed medicine to help control your cholesterol. This sheet tells you how cholesterol affects your health. It also explains how medicines can help improve your cholesterol levels.  Understanding cholesterol  Cholesterol is a type of fat (lipid) that s carried in the blood. Your body makes cholesterol in the liver. You also get it from certain foods. Your body needs some cholesterol to stay healthy. But high cholesterol makes more plaque build up in blood vessels. Plaque is a fatty substance. Over time, this plaque narrows and hardens the blood vessels. This reduces or blocks blood flow in these vessels and raises your risk for heart attack (acute myocardial infarction, or AMI), stroke, and other health problems. This is known as atherosclerotic cardiovascular disease (ASCVD).  Types of lipids  Your blood has 3 key fats:    LDL (low-density lipoprotein) cholesterol. This is called  bad  because it can cause plaque to build up in the blood vessels.    HDL (high-density lipoprotein) cholesterol. This is called  good  because it helps get rid of harmful cholesterol in the bloodstream.    Triglycerides. Your body uses this form of fat to store energy. Like LDL cholesterol, this fat can cause plaque to build up in the blood vessels.  Healthy  cholesterol levels  You can find out your levels by having a blood test. High blood cholesterol is a risk factor for getting ASCVD. Talk to your healthcare provider about what levels are best for you. To find out more about cholesterol levels, visit www.heart.org/cholesterol.  You may need your cholesterol levels checked at regular intervals. This is to see if you are meeting the cholesterol goals you and your provider have agreed on. Make sure you know what this schedule will be and how to get ready for this test.   Types of cholesterol medicines    Medicines can help control the amount of cholesterol in the blood. There are several types. Each controls cholesterol in a different way. They also have different effects in terms of how well they work to lower cholesterol and reduce death rates. Discuss these effects with your healthcare provider. Your healthcare provider will prescribe the type of medicine that is best for you. Medicines may be used alone or combined. The main types are:    Statins (HMG-CoA reductase inhibitors). Statins are thought to be the best at lowering cholesterol. They do this by keeping your body from making cholesterol. Benefits: Statins lower LDL cholesterol. They also slightly raise HDL cholesterol and lower triglycerides.    Selective cholesterol absorption inhibitors. These prevent your body from taking cholesterol from food. They may be prescribed to use alone or with a statin. Benefits: These medicines lower LDL cholesterol. They also slightly raise HDL cholesterol and lower triglycerides.    Resins (bile acid sequestrants or bile acid-binding medicines). Resins help you get rid of cholesterol through the intestines. They work by binding to bile. Bile is a substance that helps the body digest food. Your body uses cholesterol to make bile. Normally, most bile is absorbed by the body during digestion. But when bile is bound to resin, it is eliminated from the body. So the body must make  more bile. To do this, the body takes up more cholesterol from the blood. Benefits: Resins lower LDL cholesterol.    Fibrates (fibric acid derivatives). These are best at cutting back on how much triglycerides your body makes. They don t work well to lower LDL. Benefits: Fibrates lower triglycerides. They also raise HDL cholesterol.    Niacin (nicotinic acid). Niacin (vitamin B-3) affects how the liver makes blood fats. But don t use over-the-counter niacin for cholesterol problems. It isn t regulated by the FDA. Benefits: Niacin raises HDL cholesterol. It also lowers triglycerides and LDL cholesterol.    Omega-3 fatty acids. These reduce the amount of triglycerides your body makes. They also help to clear these lipids from the blood. Omega-3 fatty acids are found in many foods. These include salmon and other oily fish, and walnuts. Your healthcare provider may prescribe these fatty acids in capsule form. Benefits: Omega-3s lower triglycerides. (Note: They may increase LDL cholesterol in some patients.)     PCSK9 inhibitors. These medicines lower LDL cholesterol levels by breaking down the chemicals in the liver that control making LDL cholesterol. These medicines are given by an injection. They are used for people who have an inherited form of high cholesterol (familial hypercholesterolemia). They are also for people who have a hard time controlling their cholesterol with other medicines.  High-risk groups  Some people may need to take medicines called statins to control their cholesterol. This is in addition to eating a healthy diet and getting regular exercise.  Statins can help you stay healthy. They can also help prevent a heart attack or stroke. You may need to take a statin if you are in one of these groups:    Adults who have had a heart attack or stroke. Or adults who have had peripheral vascular disease, a ministroke (transient ischemic attack), or stable or unstable angina. This group also includes people  who have had a procedure to restore blood flow through a blocked artery. These procedures include percutaneous coronary intervention, angioplasty, stent, and open-heart bypass surgery.    Adults who have diabetes. Or adults who are at higher risk of having a heart attack or stroke and have an LDL cholesterol level of 70 to 189 mg/dL    Adults who are 21 years old or older and have an LDL cholesterol level of 190 mg/dL or higher.  If you are in a high-risk group, talk with your healthcare provider about your treatment goals. Make sure you understand why these goals are important, based on your own health history and your family history of heart disease or high cholesterol.  Make a plan to have regular cholesterol checks. You may need to fast before getting this test. Also ask your provider about any side effects your medicines may cause. Let your provider know about any side effects you have. You may need to take more than one medicine to reach the cholesterol goals that you and your provider decide on.  Taking cholesterol medicines  Take your medication exactly as your healthcare provider tells you to. This will help it work best. Here are tips for taking cholesterol medicines:    Know when and how to take your medicines. Some may need to be taken with food. Others may need to be taken on an empty stomach or at a certain time of day.    Stick to a schedule. Try the following:  ? Don t skip doses or stop taking your medicine. This is important even if you feel better or if your cholesterol numbers improve.  ? Set things up to help you remember. For instance, work taking your medicines into your routine. You could plan to take them when you get up in the morning or when you go to bed at night.  ? Keep track of what you take. You may take a few different medicines. If so, a list or chart can help you take the right pills at the right time. A pillbox with days of the week or times of day is also a good tool for keeping  track.    Prevent medicine interactions. Some medicines and supplements can interact with one another. This means they affect how other medicines work when taken together. Be sure to tell your healthcare provider about all other medicines you take. This includes vitamins, herbs, and over-the-counter medicines.    Know how to deal with side effects. Many people have side effects when they first start taking a medicine. These are things like headache and stomach upset. Side effects should go away in a few weeks. Tell your healthcare provider about any side effects you have. Certain side effects should be reported to your healthcare provider right away. These include yellowing of the eyes and blurred vision. Also report muscle aches and breathing problems.  If you are pregnant or breastfeeding, tell your health care provider before taking any cholesterol medicines.  A healthy lifestyle  Your healthcare provider will help you make changes your lifestyle if needed. These changes are needed to help you lower your cholesterol and keep the ASCVD from getting worse. Things you may need to work on are:  Diet  Your healthcare provider will give you information on changes that you may need to make to your diet. You may need to see a registered dietitian or nutritionist for help with these changes. You might be asked to:    Eat less meat containing saturated fat and high levels of cholesterol    Eat less sodium (salt), especially if you have high blood pressure    Eat more fresh vegetables and fruits    Eat lean protein, like fish, chicken and turkey, and beans and peas    Eat less processed meats, like deli meats, sausage, and pepperoni    Choose non-fat and low-fat milk, yogurt and cheese    Use vegetable and nut oils instead of butter, shortening, and margarine    Limit sweets and packaged foods, like chips, cookies, and baked goods    Limit eating out at restaurants and eating fast foods  Physical activity  Your healthcare  provider may ask you to be more active. Depending on your health, your provider may recommend that you get moderate to vigorous intensity exercise for at least 40 minutes each day, 3 to 4 days each week. Some examples of moderate to vigorous exercise are:    Walking at a brisk pace, about 3-4 miles per hour    Jogging or running    Riding a bicycle or stationary bike    Swimming or water aerobics    Dancing    Hiking    Martial arts    Tennis  You may not be able to do 40 minutes right away. You may have to start with 5 to 10 minutes a day, 2 times a day, and then keep adding to it until you can do 40 minutes.  Weight management  If you are overweight or obese, your healthcare provider may tell you to lose weight and lower your BMI (body mass index). Changing your diet and getting more exercise can help. Controlling the number of calories you eat is the best way to lose weight.  Smoking  If you smoke, quit now. Ask your health care provider for information on medications that can help you fight cravings. Enroll in a stop-smoking program to improve your chances of success. Ask your provider for smoking-cessation referrals and products.  Stress  Learn ways to help you deal with stress in your home and work life. Here are a few ideas:    Take a yoga class. You can find classes at local community centers or on the Internet.    Get regular exercise. Exercise helps your mind let go of problems and release stress in your muscles.    Deep breathing. Take a few minutes several times a day to just sit quietly and breathe. Concentrate on the air going into and out of your body.    Talk with loved ones. Take a few minutes each day to connect with people that make you feel supported.  Date Last Reviewed: 2/1/2017 2000-2017 [employer]. [address] [place] [id number]. All rights reserved. This information is not intended as a substitute for professional medical care. Always follow your healthcare  professional's instructions.        Understanding Panic Disorder (Panic Attack)  A panic attack is a sudden, intense fear that lasts for several minutes when there is no real danger. With it comes terror, physical symptoms, and a strong need to escape from wherever you are. If you have these attacks often, you have panic disorder. The attacks can be very frightening. You may be scared of having another one. You may even stay away from a place where you ve had an attack. Some people become so afraid of having panic attacks, it s very hard for them to leave home.  Before accepting a diagnosis of panic disorder, it s important to see your healthcare provider. Your provider will evaluate your symptoms to make sure you don t have another health condition that is causing the symptoms. Conditions that can cause panic symptoms include certain heart and lung conditions and thyroid disease. Other things can also cause panic attack symptoms. These include having too much caffeine or using other stimulants, such as certain medicines.    What does a panic attack feel like?  Most panic attacks start suddenly, for no clear reason. They last about 5 to 20 minutes. A panic attack can start at any time, even while you re sleeping. During the attack, you may have:    A sudden surge of anxiety, as if you just missed hitting someone with your car. But with a panic attack, you re anxious for no clear reason    Physical symptoms, such as sweating, shortness of breath, a pounding heart, trembling, feeling like you re choking, chest pain, nausea, or dizziness    A fear that you re having a heart attack, dying, or about to lose control    A feeling that things happening around you are not real  What to do during a panic attack    Remind yourself that your body is having a false alarm. Nothing bad will happen to you. You ve survived attacks before, and you will this time, too.    Don t fight your feelings. Let them come. Ride them out. Focus  on a task like counting backward from 100. Think about some place relaxing, such as a tropical island or quiet meadow. Ask your healthcare provider or therapist to suggest other relaxation techniques.    If your symptoms worsen or occur more often, see your healthcare provider.  Help to overcome the fear  Fear of a panic attack can make you miserable. But with professional support and self-monitoring strategies, this fear can be managed. Ask your healthcare provider or therapist for help. Remember these tips:    Keep in mind that places and activities don t cause attacks. Separate the attack from the situation. Make an effort not to avoid the situation in the future.    Don t give in to the temptation to use alcohol or unprescribed medicines as an escape. In the long run, they will only add to your problems.   Warning signs for suicide  Panic disorders can be a discouraging, frightening condition that can lead some people to consider self-harm or suicide. It is very important to work with a trusted therapist, take any medicines as prescribed, and seek help if you have any of these symptoms:    Thinking often about taking your life    Planning how you may try to end your life.    Talking or writing about committing suicide    Feeling that death is the only solution to your problems    Feeling a pressing need to make out your will or arrange your     Giving away things you own    Participating in risky behaviors, such as sex with someone you don't know or drinking and driving  If you notice any of these warning signs, get help right away. You can call a mental health clinic, a 24-hour suicide crisis hotline, or go to a hospital emergency room.If there is an immediate risk, call 911.   Other resources    National Suicide Prevention Lifeline  126.131.3616 (269-225-FGFD)  www.suicidepreventionlifeline.org    National Suicide Hotline  230.231.1970 (800-SUICIDE)  suicidepreventionlifeline.org    National [place] on  Mental Illness (VLADIMIR)  647.876.7097  www.vladimir.org    Mental Health [facility]  446.283.5266  www.[email or web address].org  Date Last Reviewed: 5/1/2017 2000-2017 [employer]. [address] [place] [address], [place] [address]. All rights reserved. This information is not intended as a substitute for professional medical care. Always follow your healthcare professional's instructions.                Follow-ups after your visit        Who to contact     If you have questions or need follow up information about today's clinic visit or your schedule please contact [facility] directly at [phone].  Normal or non-critical lab and imaging results will be communicated to you by MyChart, letter or phone within 4 business days after the clinic has received the results. If you do not hear from us within 7 days, please contact the clinic through [employer] or phone. If you have a critical or abnormal lab result, we will notify you by phone as soon as possible.  Submit refill requests through [employer] or call your pharmacy and they will forward the refill request to us. Please allow 3 business days for your refill to be completed.          Additional Information About Your Visit        [employer] Information     [employer] gives you secure access to your electronic health record. If you see a primary care provider, you can also send messages to your care team and make appointments. If you have questions, please call your primary care clinic.  If you do not have a primary care provider, please call [phone] and they will assist you.        Care EveryWhere ID     This is your Care EveryWhere ID. This could be used by other organizations to access your [place] medical records  [id number]        Your Vitals Were     Pulse Temperature Respirations Pulse Oximetry BMI (Body Mass Index)       85 97.9  F (36.6  C) (Tympanic) 16 99% 20.09 kg/m2        Blood Pressure from Last 3 Encounters:   05/16/18 141/71   01/12/18 127/63   01/04/17  128/78    Weight from Last 3 Encounters:   05/16/18 116 lb 1.6 oz (52.7 kg)   01/12/18 118 lb 8 oz (53.8 kg)   01/04/17 122 lb 3.2 oz (55.4 kg)              Today, you had the following     No orders found for display         Today's Medication Changes          These changes are accurate as of 5/16/18 12:46 PM.  If you have any questions, ask your nurse or doctor.               Start taking these medicines.        Dose/Directions    hydrOXYzine 25 MG capsule   Commonly known as:  VISTARIL   Used for:  Panic attack, Detached retina, right   Started by:  Ekta Solo MD        Dose:  25 mg   Take 1 capsule (25 mg) by mouth 3 times daily as needed for anxiety (take one in morning, and then 1-2 times more during day as needed for panic)   Quantity:  90 capsule   Refills:  1            Where to get your medicines      Some of these will need a paper prescription and others can be bought over the counter.  Ask your nurse if you have questions.     Bring a paper prescription for each of these medications     hydrOXYzine 25 MG capsule                Primary Care Provider Office Phone # Fax #    Ekta Solo [phone] [address] [address]        Equal Access to Services     DEION CHURCH AH: Hadii brittanie beckman hadasho Soomaali, waaxda luqadaha, qaybta kaalmada adeegyada, matilde kumar. So [facility] [phone].    ATENCIÓN: Si habla español, tiene a bradford disposición servicios gratuitos de asistencia lingüística. Llame al [phone].    We comply with applicable federal civil rights laws and [place] laws. We do not discriminate on the basis of race, color, national origin, age, disability, sex, sexual orientation, or gender identity.            Thank you!     Thank you for choosing [facility]  for your care. Our goal is always to provide you with excellent care. Hearing back from our patients is one way we can continue to  improve our services. Please take a few minutes to complete the written survey that you may receive in the mail after your visit with us. Thank you!             Your Updated Medication List - Protect others around you: Learn how to safely use, store and throw away your medicines at www.disposemymeds.org.          This list is accurate as of 5/16/18 12:46 PM.  Always use your most recent med list.                   Brand Name Dispense Instructions for use Diagnosis    ANACIN 81 MG EC tablet   Generic drug:  aspirin      Take 81 mg by mouth daily        calcium carbonate 500 MG tablet    OS- mg [place]. Ca     Take 500 mg by mouth daily        COENZYME Q-10 PO      Take 100 mg by mouth        estradiol 0.075 MG/24HR BIW patch    VIVELLE-DOT    24 patch    APPLY 1 PATCH TRANSDERMALLY2 TIMES A WEEK    Menopausal syndrome (hot flashes)       hydrOXYzine 25 MG capsule    VISTARIL    90 capsule    Take 1 capsule (25 mg) by mouth 3 times daily as needed for anxiety (take one in morning, and then 1-2 times more during day as needed for panic)    Panic attack, Detached retina, right       PRESERVISION AREDS 2 Caps           TYLENOL PM EXTRA STRENGTH  MG tablet   Generic drug:  diphenhydrAMINE-acetaminophen      Take 1 tablet by mouth At Bedtime        VITAMIN D (CHOLECALCIFEROL) PO      Take 400 Units by mouth daily

## 2018-05-16 NOTE — PROGRESS NOTES
SUBJECTIVE:   Amada Hurd is a 78 year old female who presents to clinic today for the following health issues:      Hyperlipidemia Follow-Up      Rate your low fat/cholesterol diet?: good    Taking statin?  No    Other lipid medications/supplements?:  none    Amount of exercise or physical activity: 2-3 days/week for an average of 30-45 minutes    Problems taking medications regularly: Yes,  codeine    Medication side effects: none    Diet: regular (no restrictions)    Recent Labs   Lab Test  01/12/18   1233  12/03/15   0942  10/16/14   1042   CHOL  262*  246*  244*   HDL  85  73  78   LDL  156*  154*  154*   TRIG  106  97  60   CHOLHDLRATIO   --    --   3.1      The 10-year ASCVD risk score (Brenda DOCKERY Jr, et al., 2013) is: 21.9%    Values used to calculate the score:      Age: 78 years      Sex: Female      Is Non- : No      Diabetic: No      Tobacco smoker: No      Systolic Blood Pressure: 141 mmHg      Is BP treated: No      HDL Cholesterol: 85 mg/dL      Total Cholesterol: 262 mg/dL     Reviewed lipid results and ASCVD risks today in clinic.  It is estimated she has an ~18-21.9% 10-yr risk of an ASCVD event (depending on BP), down to ~13% if optimal care.  Reviewed risks/benefits of statins, printed out information on them- she'll look into them and discuss at next appt.      Anxiety episode- heart starts beating really fast.  Started happening after eye surgery (2/22/18), about a month later, while still in [place].  Had it when she got on the airplane- worse in confined spaces.  Home- no known triggers.  Maybe worry about her eye, and vision issues.  R eye is really blurry, and L eye is working hard.  Anxiety sx's- heart starts racing.  Does better if she walks, keeps moving, get her brain focused on something else, breathing.  15-30 minutes.  Goes away and is gone for the rest of the day.  Getting 1-2/day for the past month or two.    No h/o anxiety/panic.  Fam h/o- no h/o anxiety or  eye sx's.      Eye issues-   Will have next eye surgery in the next couple weeks- for cataracts due to the retinal detachment surgery.        Problem list and histories reviewed & adjusted, as indicated.  Additional history: as documented    Patient Active Problem List   Diagnosis     Hyperlipidemia LDL goal <160     Sciatica     Lumbar disc disease     Fractured coccyx (H)     Advance Care Planning     Menopausal syndrome (hot flashes)     Raynaud disease     Detached retina, right     Past Surgical History:   Procedure Laterality Date     APPENDECTOMY       CYSTOCELE REPAIR       [facility] OR OCULAR DEVICE INTRAOP DETACHED RETINA  02/22/2018    Dr. López Alcala     HYSTERECTOMY      ? part of left ovary in     REPAIR BLADDER         Social History   Substance Use Topics     Smoking status: Never Smoker     Smokeless tobacco: Never Used     Alcohol use 0.0 oz/week     0 Standard drinks or equivalent per week      Comment: 1 glass of wine- 2-3 times a week     Family History   Problem Relation Age of Onset     CANCER       Hypertension Father      Alzheimer Disease           Current Outpatient Prescriptions   Medication Sig Dispense Refill     aspirin (ANACIN) 81 MG EC tablet Take 81 mg by mouth daily       calcium carbonate (OS- MG [place]. [place]) 500 MG tablet Take 500 mg by mouth daily       COENZYME Q-10 PO Take 100 mg by mouth       diphenhydrAMINE-acetaminophen (TYLENOL PM EXTRA STRENGTH)  MG tablet Take 1 tablet by mouth At Bedtime       estradiol (VIVELLE-DOT) 0.075 MG/24HR BIW patch APPLY 1 PATCH TRANSDERMALLY2 TIMES A WEEK 24 patch 3     hydrOXYzine (VISTARIL) 25 MG capsule Take 1 capsule (25 mg) by mouth 3 times daily as needed for anxiety (take one in morning, and then 1-2 times more during day as needed for panic) 90 capsule 1     Multiple Vitamins-Minerals (PRESERVISION AREDS 2) CAPS        VITAMIN D, CHOLECALCIFEROL, PO Take 400 Units by mouth daily       Allergies   Allergen Reactions      Codeine Nausea and Vomiting        BP Readings from Last 3 Encounters:   05/16/18 141/71   01/12/18 127/63   01/04/17 128/78    Wt Readings from Last 3 Encounters:   05/16/18 116 lb 1.6 oz (52.7 kg)   01/12/18 118 lb 8 oz (53.8 kg)   01/04/17 122 lb 3.2 oz (55.4 kg)            Labs reviewed in EPIC    Reviewed and updated as needed this visit by clinical staff  Tobacco  Allergies  Meds  Problems  Med Hx       Reviewed and updated as needed this visit by Provider  Allergies  Meds  Problems         ROS:  Constitutional, HEENT, cardiovascular, pulmonary, gi and gu systems are negative, except as otherwise noted.    OBJECTIVE:     /71  Pulse 85  Temp 97.9  F (36.6  C) (Tympanic)  Resp 16  Wt 116 lb 1.6 oz (52.7 kg)  SpO2 99%  BMI 20.09 kg/m2  Body mass index is 20.09 kg/(m^2).  GENERAL APPEARANCE: healthy, alert and no distress     EYES: PERRL, sclera clear     HENT: nose and mouth without ulcers or lesions     NECK: no adenopathy, no asymmetry, masses, or scars and thyroid normal to palpation     RESP: lungs clear to auscultation - no rales, rhonchi or wheezes     CV: regular rates and rhythm, normal S1 S2, no S3 or S4 and no murmur, click or rub      Abdomen: soft, nontender, no HSM or masses and bowel sounds normal     Ext: warm, dry, no edema      Psych: full range affect, normal speech and grooming, judgement and insight intact     Diagnostic Test Results:  Results for orders placed or performed in visit on 01/12/18   Lipid panel reflex to direct LDL Fasting   Result Value Ref Range    Cholesterol 262 (H) <200 mg/dL    Triglycerides 106 <150 mg/dL    HDL Cholesterol 85 >49 mg/dL    LDL Cholesterol Calculated 156 (H) <100 mg/dL    Non HDL Cholesterol 177 (H) <130 mg/dL   Glucose   Result Value Ref Range    Glucose 89 70 - 99 mg/dL   Vitamin D Deficiency   Result Value Ref Range    Vitamin D Deficiency screening 60 20 - 75 ug/L        ASSESSMENT/PLAN:       ICD-10-CM    1. Panic attack F41.0  hydrOXYzine (VISTARIL) 25 MG capsule   2. Detached retina, right H33.21 hydrOXYzine (VISTARIL) 25 MG capsule   3. Hyperlipidemia LDL goal <160 E78.5      Panic attacks/detached retina/vision issues-   Pt has been having what sounds like 15-30 minute panic attacks with anxiety and racing heart sx's.  These have occurred most often if she's in enclosed spaces (clinic, airplane, etc).  She feels she's also been more anxious due to sudden blurry issues since retinal detachment a few wks ago.  Hoping sx's will improve significantly after her upcoming cataract surgery- thinks that will happen in the next month.  Given the timing of everything, discussed but will hold off on SSRI txt for now.  She is in agreement in avoiding ativan/similar given se's/risks.  As she's tolerated benadryl okay (1/2 tab 1-2 times daily), will start vistaril 25mg in morning, and then can take 1-2 times more during day if/when she has a panic attacks.  She is already taking tylenol-PM at night.    If sx's worsen, or do not improve after surgery improves vision, rtc to discuss.    Lipids-   Reviewed lipid results and ASCVD risks today in clinic.  It is estimated she has an ~18-21.9% 10-yr risk of an ASCVD event (depending on BP), down to ~13% if optimal care.  Reviewed risks/benefits of statins, printed out information on them- she'll look into them and discuss at next appt.  Long discussion about ASCVD risks, diet changes, medication options, risks/benefits.  She doesn't like to do meds, but also doesn't want to have a stroke/MI.  Printed out information, and she'll think about the medication option, and work on diet.  RTC to discuss.      Ekta Solo MD  [facility]      Spent greater than 50% of 40 minutes in face to face time counseling patient regarding etiology, diagnosis and treatment options for panic attacks, and possible other etiologies, as well as lipid treatment pros/cons/risks.

## 2018-08-01 ENCOUNTER — OFFICE VISIT (OUTPATIENT)
Dept: FAMILY MEDICINE | Facility: CLINIC | Age: 78
End: 2018-08-01
Payer: COMMERCIAL

## 2018-08-01 VITALS
WEIGHT: 116.4 LBS | RESPIRATION RATE: 16 BRPM | SYSTOLIC BLOOD PRESSURE: 132 MMHG | BODY MASS INDEX: 19.87 KG/M2 | TEMPERATURE: 98.4 F | OXYGEN SATURATION: 100 % | DIASTOLIC BLOOD PRESSURE: 70 MMHG | HEIGHT: 64 IN | HEART RATE: 78 BPM

## 2018-08-01 DIAGNOSIS — Z01.818 PREOP GENERAL PHYSICAL EXAM: Primary | ICD-10-CM

## 2018-08-01 PROCEDURE — 99214 OFFICE O/P EST MOD 30 MIN: CPT | Performed by: FAMILY MEDICINE

## 2018-08-01 NOTE — PROGRESS NOTES
[facility]  [address]  [facility] [address] [phone]  Dept: [phone]    PRE-OP EVALUATION:  Today's date: 2018    Amada Hurd (: 1940) presents for pre-operative evaluation assessment as requested by Dr. Justyn Ly.  She requires evaluation and anesthesia risk assessment prior to undergoing surgery/procedure for treatment of R eye cataract.    Fax number for surgical facility:   Primary Physician: Ekta Solo  Type of Anesthesia Anticipated: to be determined    Patient has a Health Care Directive or Living Will:  YES     Preop Questions 2018   Who is doing your surgery? Dr. Justyn Ly   What are you having done? Cataract Removal   Date of Surgery/Procedure: 2018   Facility or Hospital where procedure/surgery will be performed: [facility]   1.  Do you have a history of Heart attack, stroke, stent, coronary bypass surgery, or other heart surgery? No   2.  Do you ever have any pain or discomfort in your chest? No   3.  Do you have a history of  Heart Failure? No   4.   Are you troubled by shortness of breath when:  walking on a level surface, or up a slight hill, or at night? No   5.  Do you currently have a cold, bronchitis or other respiratory infection? No   6.  Do you have a cough, shortness of breath, or wheezing? No   7.  Do you sometimes get pains in the calves of your legs when you walk? No   8. Do you or anyone in your family have previous history of blood clots? No   9.  Do you or does anyone in your family have a serious bleeding problem such as prolonged bleeding following surgeries or cuts? No   10. Have you ever had problems with anemia or been told to take iron pills? No   11. Have you had any abnormal blood loss such as black, tarry or bloody stools, or abnormal vaginal bleeding? No   12. Have you ever had a blood transfusion? No   13. Have you or any of your relatives ever had problems with anesthesia? YES -  vomiting prior to surgery in [place] (for detached retina), went back the next day to try surgery again, given something else, really relaxed her, didn't put her out, did much better on that   14. Do you have sleep apnea, excessive snoring or daytime drowsiness? No   15. Do you have any prosthetic heart valves? No   16. Do you have prosthetic joints? No   17. Is there any chance that you may be pregnant? No         HPI:     HPI related to upcoming procedure:   R eye cataract  2/18 detached retina, needed to wait until it heals.  Cataract developed - vision blurry since surgery, getting worse.  Hasn't been able to drive.  L eye vision, but feels vision is getting worse on that side as well.    Is still having some panic attack sx's (started after retinal detachment sx's in ~2/18).  Trying to keep herself calm, staying home more, not entertaining as much.  Going for walks near-by helps, and staying in a comfortable surroundings helps.  Tried the vistaril for panic sx's, but it gave her stomach upset.  Has tried benadryl in past.        MEDICAL HISTORY:     Patient Active Problem List    Diagnosis Date Noted     Detached retina, right 05/16/2018     Priority: Medium     Surgery in [place]- 2/22/18.  Following with Dr. Hernandez at [facility]  [facility] for routine cares.       Raynaud disease      Priority: Medium     Menopausal syndrome (hot flashes) 10/19/2016     Priority: Medium     HRT txt with estrodiol patch- went on it many yrs ago (? After hysterectomy)- for hot flashes, depression, foggy brain- helped a lot.  Lowered dose likely in 10/14 when she met Dr. Jose.  Did have a hard time at first after lowering dose, but has adjusted and is fine with this dose now.  Has tried off, for a couple wks- loses her energy, depressed when off the hormone.  Understands risks- would like to stay on the patch (1/17- refilled for a year).       Advance Care Planning 02/29/2016     Priority: Medium     Advance  Care Planning 2/29/2016: Receipt of ACP document:  Received: Health Care Directive which was witnessed or notarized on 8/11/2014.  Document not previously scanned.  Validation form completed and sent with document to be scanned.  Code Status needs to be updated to reflect choices in most recent ACP document. Orders placed.  Confirmed/documented designated decision maker(s).  Added by Ghislaine Salazar             Sciatica      Priority: Medium     x 26 years/ spine clinic [facility]/ ortho       Lumbar disc disease      Priority: Medium     MRI L5 - S 1          Fractured coccyx (H)      Priority: Medium     Hyperlipidemia LDL goal <160 10/16/2014     Priority: Medium     1/18 result note-  -Your cholesterol panel looks abnormal with a high LDL (the bad cholesterol), but a very good HDL (the good cholesterol).  Running the numbers through the guidelines, you are estimated to have a risk of 16.3% chance of having a cardivascular event in the next 10 years (and we recommend starting a statin medication if your risk is more than 5-7.5%).  I would come back in to discuss the pros/cons/risks of taking a statin medication (like lipitor, crestor, simvastatin).  5/18- clinic review- 18-20% 10-yr risk est (depending on BP), down to 13% if optimal care.  Reviewed risks/benefits of statins, printed out information on them- she'll look into them and discuss at next appt.        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     Cystocele      Dry eye      Dysphagia      Fractured coccyx (H)      GERD (gastroesophageal reflux disease)      Hematuria      Hip pain      Hypertension      Insomnia, unspecified      Lumbar disc disease     MRI L5 - S 1      Macrocytosis      Menopausal and female climacteric states      Osteopenia      Peptic ulcer      Raynaud disease      Sciatica     x 26 years/ spine clinic [facility]/ ortho     Tetanus toxoid  inoculation     2009     Unspecified menopausal and postmenopausal disorder      Unspecified vitamin D deficiency      Urinary incontinence      Vaginal delivery     x3     Past Surgical History:   Procedure Laterality Date     APPENDECTOMY       CYSTOCELE REPAIR       [facility] OR OCULAR DEVICE INTRAOP DETACHED RETINA  02/22/2018    Dr. López [facility]     HYSTERECTOMY      ? part of left ovary in     REPAIR BLADDER       Current Outpatient Prescriptions   Medication Sig Dispense Refill     aspirin (ANACIN) 81 MG EC tablet Take 81 mg by mouth daily       calcium carbonate (OS- MG [place]. [place]) 500 MG tablet Take 500 mg by mouth daily       COENZYME Q-10 PO Take 100 mg by mouth       diphenhydrAMINE-acetaminophen (TYLENOL PM EXTRA STRENGTH)  MG tablet Take 1 tablet by mouth At Bedtime       estradiol (VIVELLE-DOT) 0.075 MG/24HR BIW patch APPLY 1 PATCH TRANSDERMALLY2 TIMES A WEEK 24 patch 3     hydrOXYzine (VISTARIL) 25 MG capsule Take 1 capsule (25 mg) by mouth 3 times daily as needed for anxiety (take one in morning, and then 1-2 times more during day as needed for panic) 90 capsule 1     Multiple Vitamins-Minerals (PRESERVISION AREDS 2) CAPS        VITAMIN D, CHOLECALCIFEROL, PO Take 400 Units by mouth daily       OTC products: no recent use of OTC NSAIDS or Steroids    Allergies   Allergen Reactions     Codeine Nausea and Vomiting      Latex Allergy: NO    Social History   Substance Use Topics     Smoking status: Never Smoker     Smokeless tobacco: Never Used     Alcohol use 0.0 oz/week     0 Standard drinks or equivalent per week      Comment: 1 glass of wine- 2-3 times a week     History   Drug Use No       REVIEW OF SYSTEMS:   CONSTITUTIONAL: NEGATIVE for fever, chills, change in weight  INTEGUMENTARY/SKIN: NEGATIVE for worrisome rashes, moles or lesions  EYES: vision changes as above  ENT/MOUTH: NEGATIVE for ear, mouth and throat problems  RESP: NEGATIVE for significant cough or SOB  CV: NEGATIVE for  "chest pain, palpitations or peripheral edema  GI: NEGATIVE for nausea, abdominal pain, heartburn, or change in bowel habits  : NEGATIVE for frequency, dysuria, or hematuria  MUSCULOSKELETAL: NEGATIVE for significant arthralgias or myalgia  NEURO: NEGATIVE for weakness, dizziness or paresthesias  ENDOCRINE: NEGATIVE for temperature intolerance, skin/hair changes  HEME: NEGATIVE for bleeding problems  PSYCHIATRIC: more anxiety lately due to stress about her eye and vision and worry about upcoming surgery    EXAM:   /70  Pulse 78  Temp 98.4  F (36.9  C) (Oral)  Resp 16  Ht 5' 3.75\" (1.619 m)  Wt 116 lb 6.4 oz (52.8 kg)  SpO2 100%  BMI 20.14 kg/m2    GENERAL APPEARANCE: healthy, alert and no distress     EYES: EOMI, PERRL     HENT: ear canals and TM's normal and nose and mouth without ulcers or lesions     NECK: no adenopathy, no asymmetry, masses, or scars and thyroid normal to palpation     RESP: lungs clear to auscultation - no rales, rhonchi or wheezes     CV: regular rates and rhythm, normal S1 S2, no S3 or S4 and no murmur, click or rub     ABDOMEN:  soft, nontender, no HSM or masses and bowel sounds normal     MS: extremities normal- no gross deformities noted, no evidence of inflammation in joints, FROM in all extremities.     SKIN: no suspicious lesions or rashes     NEURO: Normal strength and tone, sensory exam grossly normal, mentation intact and speech normal     PSYCH: mentation appears normal. and affect normal/bright     LYMPHATICS: No cervical adenopathy    DIAGNOSTICS:   No labs or EKG required for low risk surgery (cataract, skin procedure, breast biopsy, etc)    Recent Labs   Lab Test  12/03/15   0942  10/16/14   1042   HGB  14.2  13.4   PLT  268  276   NA  136  137   POTASSIUM  3.9  3.7   CR  0.68  0.70        IMPRESSION:   Reason for surgery/procedure: vision loss, cataract  Diagnosis/reason for consult: evaluate for anesthesia    The proposed surgical procedure is considered LOW " risk.    REVISED CARDIAC RISK INDEX  The patient has the following serious cardiovascular risks for perioperative complications such as (MI, PE, VFib and 3  AV Block):  No serious cardiac risks  INTERPRETATION: 0 risks: Class I (very low risk - 0.4% complication rate)    The patient has the following additional risks for perioperative complications:  No identified additional risks      ICD-10-CM    1. Preop general physical exam Z01.818        RECOMMENDATIONS:     --Patient is to take all scheduled medications on the day of surgery- does not need to stop any medications.    Anticoagulant or Antiplatelet Medication Use  Bleeding risk is low for this procedure (e.g. dental, skin, cataract)        APPROVAL GIVEN to proceed with proposed procedure, without further diagnostic evaluation       Signed Electronically by: Ekta Solo MD    Copy of this evaluation report is provided to requesting physician.    [facility] Preop Guidelines    Revised Cardiac Risk Index

## 2018-08-01 NOTE — NURSING NOTE
"Chief Complaint   Patient presents with     Pre-Op Exam     /74  Pulse 78  Temp 98.4  F (36.9  C) (Oral)  Resp 16  Ht 5' 3.75\" (1.619 m)  Wt 116 lb 6.4 oz (52.8 kg)  SpO2 100%  BMI 20.14 kg/m2 Estimated body mass index is 20.14 kg/(m^2) as calculated from the following:    Height as of this encounter: 5' 3.75\" (1.619 m).    Weight as of this encounter: 116 lb 6.4 oz (52.8 kg).        Health Maintenance due pending provider review:  BP was high, used pink card, recheck manually        Macrina Rodriguez CMA  "

## 2018-08-01 NOTE — MR AVS SNAPSHOT
After Visit Summary   8/1/2018    Amada Hurd    MRN: [id number]           Patient Information     Date Of Birth          1940        Visit Information        Provider Department      8/1/2018 1:15 PM Ekta Solo MD [facility]        Today's Diagnoses     Preop general physical exam    -  1      Care Instructions      Before Your Surgery      Call your surgeon if there is any change in your health. This includes signs of a cold or flu (such as a sore throat, runny nose, cough, rash or fever).    Do not smoke, drink alcohol or take over the counter medicine (unless your surgeon or primary care doctor tells you to) for the 24 hours before and after surgery.    If you take prescribed drugs: Follow your doctor s orders about which medicines to take and which to stop until after surgery.    Eating and drinking prior to surgery: follow the instructions from your surgeon    Take a shower or bath the night before surgery. Use the soap your surgeon gave you to gently clean your skin. If you do not have soap from your surgeon, use your regular soap. Do not shave or scrub the surgery site.  Wear clean pajamas and have clean sheets on your bed.           Follow-ups after your visit        Your next 10 appointments already scheduled     Aug 14, 2018   Procedure with Justyn Ly MD   [facility] (--)    [address]  [facility] [address] [phone]              Who to contact     If you have questions or need follow up information about today's clinic visit or your schedule please contact [facility] directly at [phone].  Normal or non-critical lab and imaging results will be communicated to you by MyChart, letter or phone within 4 business days after the clinic has received the results. If you do not hear from us within 7 days, please contact the clinic through MyChart or phone. If you have a critical or abnormal lab  "result, we will notify you by phone as soon as possible.  Submit refill requests through [employer] or call your pharmacy and they will forward the refill request to us. Please allow 3 business days for your refill to be completed.          Additional Information About Your Visit        [employer] Information     [employer] gives you secure access to your electronic health record. If you see a primary care provider, you can also send messages to your care team and make appointments. If you have questions, please call your primary care clinic.  If you do not have a primary care provider, please call [phone] and they will assist you.        Care EveryWhere ID     This is your Care EveryWhere ID. This could be used by other organizations to access your [place] medical records  [id number]        Your Vitals Were     Pulse Temperature Respirations Height Pulse Oximetry BMI (Body Mass Index)    78 98.4  F (36.9  C) (Oral) 16 5' 3.75\" (1.619 m) 100% 20.14 kg/m2       Blood Pressure from Last 3 Encounters:   08/01/18 132/70   05/16/18 141/71   01/12/18 127/63    Weight from Last 3 Encounters:   08/01/18 116 lb 6.4 oz (52.8 kg)   05/16/18 116 lb 1.6 oz (52.7 kg)   01/12/18 118 lb 8 oz (53.8 kg)              Today, you had the following     No orders found for display       Primary Care Provider Office Phone # Fax #    Ekta Solo [phone] [address] [id number]        Equal Access to Services     CHRIS CHURCH : Hadii brittanie beckman hadasho Sotoro, waaxda luqadaha, qaybta kaalmada cintia, matilde kumar. So [facility] [phone].    ATENCIÓN: Si habla español, tiene a bradford disposición servicios gratuitos de asistencia lingüística. Llame al [phone].    We comply with applicable federal civil rights laws and [place] laws. We do not discriminate on the basis of race, color, national origin, age, disability, sex, sexual orientation, or gender " identity.            Thank you!     Thank you for choosing [facility]  for your care. Our goal is always to provide you with excellent care. Hearing back from our patients is one way we can continue to improve our services. Please take a few minutes to complete the written survey that you may receive in the mail after your visit with us. Thank you!             Your Updated Medication List - Protect others around you: Learn how to safely use, store and throw away your medicines at www.disposemymeds.org.          This list is accurate as of 8/1/18  2:19 PM.  Always use your most recent med list.                   Brand Name Dispense Instructions for use Diagnosis    ANACIN 81 MG EC tablet   Generic drug:  aspirin      Take 81 mg by mouth daily        calcium carbonate 500 MG tablet    OS- mg [place]. [place]     Take 500 mg by mouth daily        COENZYME Q-10 PO      Take 100 mg by mouth        estradiol 0.075 MG/24HR BIW patch    VIVELLE-DOT    24 patch    APPLY 1 PATCH TRANSDERMALLY2 TIMES A WEEK    Menopausal syndrome (hot flashes)       hydrOXYzine 25 MG capsule    VISTARIL    90 capsule    Take 1 capsule (25 mg) by mouth 3 times daily as needed for anxiety (take one in morning, and then 1-2 times more during day as needed for panic)    Panic attack, Detached retina, right       PRESERVISION AREDS 2 Caps           TYLENOL PM EXTRA STRENGTH  MG tablet   Generic drug:  diphenhydrAMINE-acetaminophen      Take 1 tablet by mouth At Bedtime        VITAMIN D (CHOLECALCIFEROL) PO      Take 400 Units by mouth daily

## 2018-08-13 NOTE — H&P (VIEW-ONLY)
[facility]  [address]  [facility] [address] [phone]  Dept: [phone]    PRE-OP EVALUATION:  Today's date: 2018    Amada Hurd (: 1940) presents for pre-operative evaluation assessment as requested by Dr. Justyn Ly.  She requires evaluation and anesthesia risk assessment prior to undergoing surgery/procedure for treatment of R eye cataract.    Fax number for surgical facility:   Primary Physician: Ekta Solo  Type of Anesthesia Anticipated: to be determined    Patient has a Health Care Directive or Living Will:  YES     Preop Questions 2018   Who is doing your surgery? Dr. Justyn Ly   What are you having done? Cataract Removal   Date of Surgery/Procedure: 2018   Facility or Hospital where procedure/surgery will be performed: [facility]   1.  Do you have a history of Heart attack, stroke, stent, coronary bypass surgery, or other heart surgery? No   2.  Do you ever have any pain or discomfort in your chest? No   3.  Do you have a history of  Heart Failure? No   4.   Are you troubled by shortness of breath when:  walking on a level surface, or up a slight hill, or at night? No   5.  Do you currently have a cold, bronchitis or other respiratory infection? No   6.  Do you have a cough, shortness of breath, or wheezing? No   7.  Do you sometimes get pains in the calves of your legs when you walk? No   8. Do you or anyone in your family have previous history of blood clots? No   9.  Do you or does anyone in your family have a serious bleeding problem such as prolonged bleeding following surgeries or cuts? No   10. Have you ever had problems with anemia or been told to take iron pills? No   11. Have you had any abnormal blood loss such as black, tarry or bloody stools, or abnormal vaginal bleeding? No   12. Have you ever had a blood transfusion? No   13. Have you or any of your relatives ever had problems with anesthesia? YES -  vomiting prior to surgery in [place] (for detached retina), went back the next day to try surgery again, given something else, really relaxed her, didn't put her out, did much better on that   14. Do you have sleep apnea, excessive snoring or daytime drowsiness? No   15. Do you have any prosthetic heart valves? No   16. Do you have prosthetic joints? No   17. Is there any chance that you may be pregnant? No         HPI:     HPI related to upcoming procedure:   R eye cataract  2/18 detached retina, needed to wait until it heals.  Cataract developed - vision blurry since surgery, getting worse.  Hasn't been able to drive.  L eye vision, but feels vision is getting worse on that side as well.    Is still having some panic attack sx's (started after retinal detachment sx's in ~2/18).  Trying to keep herself calm, staying home more, not entertaining as much.  Going for walks near-by helps, and staying in a comfortable surroundings helps.  Tried the vistaril for panic sx's, but it gave her stomach upset.  Has tried benadryl in past.        MEDICAL HISTORY:     Patient Active Problem List    Diagnosis Date Noted     Detached retina, right 05/16/2018     Priority: Medium     Surgery in [place]- 2/22/18.  Following with Dr. Hernandez [facility] for routine cares.       Raynaud disease      Priority: Medium     Menopausal syndrome (hot flashes) 10/19/2016     Priority: Medium     HRT txt with estrodiol patch- went on it many yrs ago (? After hysterectomy)- for hot flashes, depression, foggy brain- helped a lot.  Lowered dose likely in 10/14 when she met Dr. Jose.  Did have a hard time at first after lowering dose, but has adjusted and is fine with this dose now.  Has tried off, for a couple wks- loses her energy, depressed when off the hormone.  Understands risks- would like to stay on the patch (1/17- refilled for a year).       Advance Care Planning 02/29/2016     Priority: Medium     Advance  Care Planning 2/29/2016: Receipt of ACP document:  Received: Health Care Directive which was witnessed or notarized on 8/11/2014.  Document not previously scanned.  Validation form completed and sent with document to be scanned.  Code Status needs to be updated to reflect choices in most recent ACP document. Orders placed.  Confirmed/documented designated decision maker(s).  Added by Ghislaine Salazar             Sciatica      Priority: Medium     x 26 years/ spine clinic [facility]/ ortho       Lumbar disc disease      Priority: Medium     MRI L5 - S 1          Fractured coccyx (H)      Priority: Medium     Hyperlipidemia LDL goal <160 10/16/2014     Priority: Medium     1/18 result note-  -Your cholesterol panel looks abnormal with a high LDL (the bad cholesterol), but a very good HDL (the good cholesterol).  Running the numbers through the guidelines, you are estimated to have a risk of 16.3% chance of having a cardivascular event in the next 10 years (and we recommend starting a statin medication if your risk is more than 5-7.5%).  I would come back in to discuss the pros/cons/risks of taking a statin medication (like lipitor, crestor, simvastatin).  5/18- clinic review- 18-20% 10-yr risk est (depending on BP), down to 13% if optimal care.  Reviewed risks/benefits of statins, printed out information on them- she'll look into them and discuss at next appt.        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     Cystocele      Dry eye      Dysphagia      Fractured coccyx (H)      GERD (gastroesophageal reflux disease)      Hematuria      Hip pain      Hypertension      Insomnia, unspecified      Lumbar disc disease     MRI L5 - S 1      Macrocytosis      Menopausal and female climacteric states      Osteopenia      Peptic ulcer      Raynaud disease      Sciatica     x 26 years/ spine clinic [facility]/ ortho     Tetanus toxoid  inoculation     2009     Unspecified menopausal and postmenopausal disorder      Unspecified vitamin D deficiency      Urinary incontinence      Vaginal delivery     x3     Past Surgical History:   Procedure Laterality Date     APPENDECTOMY       CYSTOCELE REPAIR       [facility] OR OCULAR DEVICE INTRAOP DETACHED RETINA  02/22/2018    Dr. López Hoffmillion     HYSTERECTOMY      ? part of left ovary in     REPAIR BLADDER       Current Outpatient Prescriptions   Medication Sig Dispense Refill     aspirin (ANACIN) 81 MG EC tablet Take 81 mg by mouth daily       calcium carbonate (OS- MG [place]. CA) 500 MG tablet Take 500 mg by mouth daily       COENZYME Q-10 PO Take 100 mg by mouth       diphenhydrAMINE-acetaminophen (TYLENOL PM EXTRA STRENGTH)  MG tablet Take 1 tablet by mouth At Bedtime       estradiol (VIVELLE-DOT) 0.075 MG/24HR BIW patch APPLY 1 PATCH TRANSDERMALLY2 TIMES A WEEK 24 patch 3     hydrOXYzine (VISTARIL) 25 MG capsule Take 1 capsule (25 mg) by mouth 3 times daily as needed for anxiety (take one in morning, and then 1-2 times more during day as needed for panic) 90 capsule 1     Multiple Vitamins-Minerals (PRESERVISION AREDS 2) CAPS        VITAMIN D, CHOLECALCIFEROL, PO Take 400 Units by mouth daily       OTC products: no recent use of OTC NSAIDS or Steroids    Allergies   Allergen Reactions     Codeine Nausea and Vomiting      Latex Allergy: NO    Social History   Substance Use Topics     Smoking status: Never Smoker     Smokeless tobacco: Never Used     Alcohol use 0.0 oz/week     0 Standard drinks or equivalent per week      Comment: 1 glass of wine- 2-3 times a week     History   Drug Use No       REVIEW OF SYSTEMS:   CONSTITUTIONAL: NEGATIVE for fever, chills, change in weight  INTEGUMENTARY/SKIN: NEGATIVE for worrisome rashes, moles or lesions  EYES: vision changes as above  ENT/MOUTH: NEGATIVE for ear, mouth and throat problems  RESP: NEGATIVE for significant cough or SOB  CV: NEGATIVE for  "chest pain, palpitations or peripheral edema  GI: NEGATIVE for nausea, abdominal pain, heartburn, or change in bowel habits  : NEGATIVE for frequency, dysuria, or hematuria  MUSCULOSKELETAL: NEGATIVE for significant arthralgias or myalgia  NEURO: NEGATIVE for weakness, dizziness or paresthesias  ENDOCRINE: NEGATIVE for temperature intolerance, skin/hair changes  HEME: NEGATIVE for bleeding problems  PSYCHIATRIC: more anxiety lately due to stress about her eye and vision and worry about upcoming surgery    EXAM:   /70  Pulse 78  Temp 98.4  F (36.9  C) (Oral)  Resp 16  Ht 5' 3.75\" (1.619 m)  Wt 116 lb 6.4 oz (52.8 kg)  SpO2 100%  BMI 20.14 kg/m2    GENERAL APPEARANCE: healthy, alert and no distress     EYES: EOMI, PERRL     HENT: ear canals and TM's normal and nose and mouth without ulcers or lesions     NECK: no adenopathy, no asymmetry, masses, or scars and thyroid normal to palpation     RESP: lungs clear to auscultation - no rales, rhonchi or wheezes     CV: regular rates and rhythm, normal S1 S2, no S3 or S4 and no murmur, click or rub     ABDOMEN:  soft, nontender, no HSM or masses and bowel sounds normal     MS: extremities normal- no gross deformities noted, no evidence of inflammation in joints, FROM in all extremities.     SKIN: no suspicious lesions or rashes     NEURO: Normal strength and tone, sensory exam grossly normal, mentation intact and speech normal     PSYCH: mentation appears normal. and affect normal/bright     LYMPHATICS: No cervical adenopathy    DIAGNOSTICS:   No labs or EKG required for low risk surgery (cataract, skin procedure, breast biopsy, etc)    Recent Labs   Lab Test  12/03/15   0942  10/16/14   1042   HGB  14.2  13.4   PLT  268  276   NA  136  137   POTASSIUM  3.9  3.7   CR  0.68  0.70        IMPRESSION:   Reason for surgery/procedure: vision loss, cataract  Diagnosis/reason for consult: evaluate for anesthesia    The proposed surgical procedure is considered LOW " risk.    REVISED CARDIAC RISK INDEX  The patient has the following serious cardiovascular risks for perioperative complications such as (MI, PE, VFib and 3  AV Block):  No serious cardiac risks  INTERPRETATION: 0 risks: Class I (very low risk - 0.4% complication rate)    The patient has the following additional risks for perioperative complications:  No identified additional risks      ICD-10-CM    1. Preop general physical exam Z01.818        RECOMMENDATIONS:     --Patient is to take all scheduled medications on the day of surgery- does not need to stop any medications.    Anticoagulant or Antiplatelet Medication Use  Bleeding risk is low for this procedure (e.g. dental, skin, cataract)        APPROVAL GIVEN to proceed with proposed procedure, without further diagnostic evaluation       Signed Electronically by: Ekta Solo MD    Copy of this evaluation report is provided to requesting physician.    [facility] Preop Guidelines    Revised Cardiac Risk Index

## 2018-08-14 ENCOUNTER — SURGERY (OUTPATIENT)
Age: 78
End: 2018-08-14

## 2018-08-14 ENCOUNTER — HOSPITAL ENCOUNTER (OUTPATIENT)
Facility: CLINIC | Age: 78
Discharge: HOME | End: 2018-08-14
Attending: OPHTHALMOLOGY | Admitting: OPHTHALMOLOGY
Payer: COMMERCIAL

## 2018-08-14 ENCOUNTER — ANESTHESIA (OUTPATIENT)
Dept: SURGERY | Facility: CLINIC | Age: 78
End: 2018-08-14
Payer: COMMERCIAL

## 2018-08-14 ENCOUNTER — ANESTHESIA EVENT (OUTPATIENT)
Dept: SURGERY | Facility: CLINIC | Age: 78
End: 2018-08-14
Payer: COMMERCIAL

## 2018-08-14 VITALS
DIASTOLIC BLOOD PRESSURE: 63 MMHG | SYSTOLIC BLOOD PRESSURE: 147 MMHG | RESPIRATION RATE: 16 BRPM | OXYGEN SATURATION: 97 % | TEMPERATURE: 99.4 F

## 2018-08-14 PROCEDURE — 40000170 ZZH STATISTIC PRE-PROCEDURE ASSESSMENT II: Performed by: OPHTHALMOLOGY

## 2018-08-14 PROCEDURE — V2632 POST CHMBR INTRAOCULAR LENS: HCPCS | Performed by: OPHTHALMOLOGY

## 2018-08-14 PROCEDURE — 25000128 H RX IP 250 OP 636: Performed by: ANESTHESIOLOGY

## 2018-08-14 PROCEDURE — 71000028 ZZH EYE RECOVERY PHASE 2 EACH 15 MINS: Performed by: OPHTHALMOLOGY

## 2018-08-14 PROCEDURE — 27210794 ZZH OR GENERAL SUPPLY STERILE: Performed by: OPHTHALMOLOGY

## 2018-08-14 PROCEDURE — 25000125 ZZHC RX 250: Performed by: OPHTHALMOLOGY

## 2018-08-14 PROCEDURE — 25000128 H RX IP 250 OP 636: Performed by: NURSE ANESTHETIST, CERTIFIED REGISTERED

## 2018-08-14 PROCEDURE — 36000101 ZZH EYE SURGERY LEVEL 3 1ST 30 MIN: Performed by: OPHTHALMOLOGY

## 2018-08-14 PROCEDURE — 25000128 H RX IP 250 OP 636: Performed by: OPHTHALMOLOGY

## 2018-08-14 PROCEDURE — 25000125 ZZHC RX 250: Performed by: NURSE ANESTHETIST, CERTIFIED REGISTERED

## 2018-08-14 PROCEDURE — 37000008 ZZH ANESTHESIA TECHNICAL FEE, 1ST 30 MIN: Performed by: OPHTHALMOLOGY

## 2018-08-14 DEVICE — EYE IMP IOL AMO PCL TECNIS ZCB00 20.5: Type: IMPLANTABLE DEVICE | Site: EYE | Status: FUNCTIONAL

## 2018-08-14 RX ORDER — ONDANSETRON 2 MG/ML
INJECTION INTRAMUSCULAR; INTRAVENOUS PRN
Status: DISCONTINUED | OUTPATIENT
Start: 2018-08-14 | End: 2018-08-14

## 2018-08-14 RX ORDER — MOXIFLOXACIN 5 MG/ML
1 SOLUTION/ DROPS OPHTHALMIC
Status: COMPLETED | OUTPATIENT
Start: 2018-08-14 | End: 2018-08-14

## 2018-08-14 RX ORDER — OFLOXACIN 3 MG/ML
SOLUTION/ DROPS OPHTHALMIC PRN
Status: DISCONTINUED | OUTPATIENT
Start: 2018-08-14 | End: 2018-08-14 | Stop reason: HOSPADM

## 2018-08-14 RX ORDER — PREDNISOLONE ACETATE 10 MG/ML
SUSPENSION/ DROPS OPHTHALMIC PRN
Status: DISCONTINUED | OUTPATIENT
Start: 2018-08-14 | End: 2018-08-14 | Stop reason: HOSPADM

## 2018-08-14 RX ORDER — TROPICAMIDE 10 MG/ML
1 SOLUTION/ DROPS OPHTHALMIC
Status: COMPLETED | OUTPATIENT
Start: 2018-08-14 | End: 2018-08-14

## 2018-08-14 RX ORDER — PHENYLEPHRINE HYDROCHLORIDE 25 MG/ML
1 SOLUTION/ DROPS OPHTHALMIC
Status: COMPLETED | OUTPATIENT
Start: 2018-08-14 | End: 2018-08-14

## 2018-08-14 RX ORDER — LIDOCAINE HYDROCHLORIDE 20 MG/ML
INJECTION, SOLUTION INFILTRATION; PERINEURAL PRN
Status: DISCONTINUED | OUTPATIENT
Start: 2018-08-14 | End: 2018-08-14

## 2018-08-14 RX ORDER — DEXAMETHASONE SODIUM PHOSPHATE 4 MG/ML
INJECTION, SOLUTION INTRA-ARTICULAR; INTRALESIONAL; INTRAMUSCULAR; INTRAVENOUS; SOFT TISSUE PRN
Status: DISCONTINUED | OUTPATIENT
Start: 2018-08-14 | End: 2018-08-14

## 2018-08-14 RX ORDER — LIDOCAINE HYDROCHLORIDE 10 MG/ML
INJECTION, SOLUTION EPIDURAL; INFILTRATION; INTRACAUDAL; PERINEURAL PRN
Status: DISCONTINUED | OUTPATIENT
Start: 2018-08-14 | End: 2018-08-14 | Stop reason: HOSPADM

## 2018-08-14 RX ORDER — LIDOCAINE 40 MG/G
CREAM TOPICAL
Status: DISCONTINUED | OUTPATIENT
Start: 2018-08-14 | End: 2018-08-14 | Stop reason: HOSPADM

## 2018-08-14 RX ORDER — PROPOFOL 10 MG/ML
INJECTION, EMULSION INTRAVENOUS PRN
Status: DISCONTINUED | OUTPATIENT
Start: 2018-08-14 | End: 2018-08-14

## 2018-08-14 RX ORDER — PROPARACAINE HYDROCHLORIDE 5 MG/ML
1 SOLUTION/ DROPS OPHTHALMIC ONCE
Status: COMPLETED | OUTPATIENT
Start: 2018-08-14 | End: 2018-08-14

## 2018-08-14 RX ORDER — PROPARACAINE HYDROCHLORIDE 5 MG/ML
1 SOLUTION/ DROPS OPHTHALMIC ONCE
Status: DISCONTINUED | OUTPATIENT
Start: 2018-08-14 | End: 2018-08-14 | Stop reason: HOSPADM

## 2018-08-14 RX ORDER — FENTANYL CITRATE 50 UG/ML
INJECTION, SOLUTION INTRAMUSCULAR; INTRAVENOUS PRN
Status: DISCONTINUED | OUTPATIENT
Start: 2018-08-14 | End: 2018-08-14

## 2018-08-14 RX ORDER — DICLOFENAC SODIUM 1 MG/ML
1 SOLUTION/ DROPS OPHTHALMIC
Status: COMPLETED | OUTPATIENT
Start: 2018-08-14 | End: 2018-08-14

## 2018-08-14 RX ORDER — BALANCED SALT SOLUTION 6.4; .75; .48; .3; 3.9; 1.7 MG/ML; MG/ML; MG/ML; MG/ML; MG/ML; MG/ML
SOLUTION OPHTHALMIC PRN
Status: DISCONTINUED | OUTPATIENT
Start: 2018-08-14 | End: 2018-08-14 | Stop reason: HOSPADM

## 2018-08-14 RX ORDER — SODIUM CHLORIDE, SODIUM LACTATE, POTASSIUM CHLORIDE, CALCIUM CHLORIDE 600; 310; 30; 20 MG/100ML; MG/100ML; MG/100ML; MG/100ML
INJECTION, SOLUTION INTRAVENOUS CONTINUOUS
Status: DISCONTINUED | OUTPATIENT
Start: 2018-08-14 | End: 2018-08-14 | Stop reason: HOSPADM

## 2018-08-14 RX ADMIN — PROPARACAINE HYDROCHLORIDE 1 DROP: 5 SOLUTION/ DROPS OPHTHALMIC at 08:51

## 2018-08-14 RX ADMIN — CEFUROXIME 0.1 ML: 1.5 INJECTION, POWDER, FOR SOLUTION INTRAVENOUS at 10:27

## 2018-08-14 RX ADMIN — LIDOCAINE HYDROCHLORIDE 1 ML: 10 INJECTION, SOLUTION EPIDURAL; INFILTRATION; INTRACAUDAL; PERINEURAL at 10:27

## 2018-08-14 RX ADMIN — DEXAMETHASONE SODIUM PHOSPHATE 4 MG: 4 INJECTION, SOLUTION INTRA-ARTICULAR; INTRALESIONAL; INTRAMUSCULAR; INTRAVENOUS; SOFT TISSUE at 10:08

## 2018-08-14 RX ADMIN — PROPOFOL 30 MG: 10 INJECTION, EMULSION INTRAVENOUS at 10:12

## 2018-08-14 RX ADMIN — MOXIFLOXACIN 1 DROP: 5 SOLUTION/ DROPS OPHTHALMIC at 08:52

## 2018-08-14 RX ADMIN — SODIUM CHLORIDE, POTASSIUM CHLORIDE, SODIUM LACTATE AND CALCIUM CHLORIDE: 600; 310; 30; 20 INJECTION, SOLUTION INTRAVENOUS at 10:00

## 2018-08-14 RX ADMIN — DEXMEDETOMIDINE HYDROCHLORIDE 8 MCG: 100 INJECTION, SOLUTION INTRAVENOUS at 10:04

## 2018-08-14 RX ADMIN — DICLOFENAC SODIUM 1 DROP: 1 SOLUTION OPHTHALMIC at 08:58

## 2018-08-14 RX ADMIN — PHENYLEPHRINE HYDROCHLORIDE 1 DROP: 2.5 SOLUTION/ DROPS OPHTHALMIC at 08:51

## 2018-08-14 RX ADMIN — DICLOFENAC SODIUM 1 DROP: 1 SOLUTION OPHTHALMIC at 08:51

## 2018-08-14 RX ADMIN — MOXIFLOXACIN 1 DROP: 5 SOLUTION/ DROPS OPHTHALMIC at 09:08

## 2018-08-14 RX ADMIN — MIDAZOLAM 1 MG: 1 INJECTION INTRAMUSCULAR; INTRAVENOUS at 10:07

## 2018-08-14 RX ADMIN — TROPICAMIDE 1 DROP: 10 SOLUTION/ DROPS OPHTHALMIC at 08:52

## 2018-08-14 RX ADMIN — TROPICAMIDE 1 DROP: 10 SOLUTION/ DROPS OPHTHALMIC at 08:58

## 2018-08-14 RX ADMIN — ONDANSETRON 4 MG: 2 INJECTION INTRAMUSCULAR; INTRAVENOUS at 10:12

## 2018-08-14 RX ADMIN — EPINEPHRINE 500 ML: 1 INJECTION, SOLUTION, CONCENTRATE INTRAVENOUS at 10:26

## 2018-08-14 RX ADMIN — LIDOCAINE HYDROCHLORIDE 20 MG: 20 INJECTION, SOLUTION INFILTRATION; PERINEURAL at 10:10

## 2018-08-14 RX ADMIN — TROPICAMIDE 1 DROP: 10 SOLUTION/ DROPS OPHTHALMIC at 09:08

## 2018-08-14 RX ADMIN — FENTANYL CITRATE 25 MCG: 50 INJECTION, SOLUTION INTRAMUSCULAR; INTRAVENOUS at 10:07

## 2018-08-14 RX ADMIN — PHENYLEPHRINE HYDROCHLORIDE 1 DROP: 2.5 SOLUTION/ DROPS OPHTHALMIC at 08:58

## 2018-08-14 RX ADMIN — PHENYLEPHRINE HYDROCHLORIDE 1 DROP: 2.5 SOLUTION/ DROPS OPHTHALMIC at 09:08

## 2018-08-14 RX ADMIN — DICLOFENAC SODIUM 1 DROP: 1 SOLUTION OPHTHALMIC at 09:08

## 2018-08-14 RX ADMIN — MOXIFLOXACIN 1 DROP: 5 SOLUTION/ DROPS OPHTHALMIC at 08:59

## 2018-08-14 RX ADMIN — BALANCED SALT SOLUTION 1 APPLICATOR: 6.4; .75; .48; .3; 3.9; 1.7 SOLUTION OPHTHALMIC at 10:26

## 2018-08-14 RX ADMIN — PREDNISOLONE ACETATE 1 DROP: 10 SUSPENSION/ DROPS OPHTHALMIC at 10:32

## 2018-08-14 RX ADMIN — OFLOXACIN 1 DROP: 3 SOLUTION/ DROPS OPHTHALMIC at 10:32

## 2018-08-14 RX ADMIN — MIDAZOLAM 1 MG: 1 INJECTION INTRAMUSCULAR; INTRAVENOUS at 10:04

## 2018-08-14 NOTE — ANESTHESIA PREPROCEDURE EVALUATION
Anesthesia Evaluation     . Pt has had prior anesthetic.     History of anesthetic complications   - PONV        ROS/MED HX    ENT/Pulmonary:      (-) sleep apnea   Neurologic: Comment: Sciatica, insomnia, Raynaud's       Cardiovascular:     (+) Dyslipidemia, hypertension----. : . . . :. .       METS/Exercise Tolerance:     Hematologic:         Musculoskeletal: Comment: L-DDD  (+) arthritis, , , -       GI/Hepatic: Comment: PUD, dysphagia     (+) GERD Asymptomatic on medication,       Renal/Genitourinary:         Endo:         Psychiatric:         Infectious Disease:         Malignancy:         Other:                     Physical Exam      Airway   Mallampati: II  TM distance: >3 FB  Neck ROM: full    Dental   (+) caps    Cardiovascular   Rhythm and rate: regular      Pulmonary    breath sounds clear to auscultation                    Anesthesia Plan      History & Physical Review  History and physical reviewed and following examination; no interval change.    ASA Status:  2 .        Plan for MAC   PONV prophylaxis:  Ondansetron (or other 5HT-3) and Dexamethasone or Solumedrol  Antiemesis       Postoperative Care      Consents  Anesthetic plan, risks, benefits and alternatives discussed with:  Patient..                          .  Procedure: Procedure(s):  PHACOEMULSIFICATION CLEAR CORNEA WITH STANDARD INTRAOCULAR LENS IMPLANT  Preop diagnosis: CATARACT    Allergies   Allergen Reactions     Codeine Nausea and Vomiting     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     Cystocele      Dry eye      Dysphagia      Fractured coccyx (H)      GERD (gastroesophageal reflux disease)      Hematuria      Hip pain      Hypertension      Insomnia, unspecified      Lumbar disc disease     MRI L5 - S 1      Macrocytosis      Menopausal and female climacteric states      Osteopenia      Peptic ulcer      PONV (postoperative  nausea and vomiting)     after detached retinal surgery     Raynaud disease      Sciatica     x 26 years/ spine clinic [facility]/ ortho     Tetanus toxoid inoculation     2009     Unspecified menopausal and postmenopausal disorder      Unspecified vitamin D deficiency      Urinary incontinence      Vaginal delivery     x3     Past Surgical History:   Procedure Laterality Date     APPENDECTOMY       CYSTOCELE REPAIR       [facility] OR OCULAR DEVICE INTRAOP DETACHED RETINA  02/22/2018    Dr. López Alcala     HYSTERECTOMY      ? part of left ovary in     REPAIR BLADDER       Social History   Substance Use Topics     Smoking status: Never Smoker     Smokeless tobacco: Never Used     Alcohol use 0.0 oz/week     0 Standard drinks or equivalent per week      Comment: 1 glass of wine- 2-3 times a week     Prior to Admission medications    Medication Sig Start Date End Date Taking? Authorizing Provider   aspirin (ANACIN) 81 MG EC tablet Take 81 mg by mouth daily   Yes Reported, Patient   calcium carbonate (OS- MG [place]. CA) 500 MG tablet Take 500 mg by mouth daily   Yes Reported, Patient   COENZYME Q-10 PO Take 100 mg by mouth   Yes Reported, Patient   diphenhydrAMINE-acetaminophen (TYLENOL PM EXTRA STRENGTH)  MG tablet Take 1 tablet by mouth At Bedtime   Yes Reported, Patient   estradiol (VIVELLE-DOT) 0.075 MG/24HR BIW patch APPLY 1 PATCH TRANSDERMALLY2 TIMES A WEEK 1/12/18  Yes Ekta Solo MD   Multiple Vitamins-Minerals (PRESERVISION AREDS 2) CAPS    Yes Reported, Patient   VITAMIN D, CHOLECALCIFEROL, PO Take 400 Units by mouth daily   Yes Reported, Patient   hydrOXYzine (VISTARIL) 25 MG capsule Take 1 capsule (25 mg) by mouth 3 times daily as needed for anxiety (take one in morning, and then 1-2 times more during day as needed for panic) 5/16/18   Ekta Solo MD     Current Facility-Administered Medications Ordered in Epic   Medication Dose Route Frequency Last Rate Last Dose      lactated ringers infusion   Intravenous Continuous         lidocaine (AKTEN) ophthalmic gel 0.5 mL  0.5 mL Ophthalmic Once         lidocaine (LMX4) cream   Topical Q1H PRN         lidocaine 1 % 1 mL  1 mL Other Q1H PRN         povidone-iodine 5 % ophthalmic solution 1 drop  1 drop Ophthalmic Once         proparacaine (ALCAINE) 0.5 % ophthalmic solution 1 drop  1 drop Ophthalmic Once         sodium chloride (PF) 0.9% PF flush 3 mL  3 mL Intracatheter Q1H PRN         sodium chloride (PF) 0.9% PF flush 3 mL  3 mL Intracatheter Q8H         No current [facility]-ordered outpatient prescriptions on file.       lactated ringers       Wt Readings from Last 1 Encounters:   08/01/18 52.8 kg (116 lb 6.4 oz)     Temp Readings from Last 1 Encounters:   08/14/18 37.4  C (99.4  F) (Temporal)     BP Readings from Last 6 Encounters:   08/14/18 138/71   08/01/18 132/70   05/16/18 141/71   01/12/18 127/63   01/04/17 128/78   10/19/16 124/82     Pulse Readings from Last 4 Encounters:   08/01/18 78   05/16/18 85   01/12/18 78   01/04/17 68     Resp Readings from Last 1 Encounters:   08/14/18 16     SpO2 Readings from Last 1 Encounters:   08/14/18 100%     Recent Labs   Lab Test  01/12/18   1233  12/03/15   0942  10/16/14   1042   NA   --   136  137   POTASSIUM   --   3.9  3.7   CHLORIDE   --   103  100   CO2   --   27  29   ANIONGAP   --   6  8.3   GLC  89  93  90   BUN   --   6*  9   CR   --   0.68  0.70   KIM   --   9.0  9.1     Recent Labs   Lab Test 09/16/13   AST  24   ALT  15   ALKPHOS  46   BILITOTAL  0.4     Recent Labs   Lab Test  12/03/15   0942  10/16/14   1042   WBC  5.9  6.6   HGB  14.2  13.4   PLT  268  276     No results for input(s): ABO, RH in the last 11843 hours.  No results for input(s): INR, PTT in the last 20677 hours.   No results for input(s): TROPI in the last 44079 hours.  No results for input(s): PH, PCO2, PO2, HCO3 in the last 20211 hours.  No results for input(s): HCG in the last 36467 hours.  No results found for  this or any previous visit (from the past 744 hour(s)).    RECENT LABS:   ECG:   ECHO:

## 2018-08-14 NOTE — OP NOTE
Pre-Operative Diagnosis: Visually Significant Cataract, right eye    Post-Operative Diagnosis: Same    Procedure: Cataract Extraction with Intraocular Lens placement, right eye.    Surgeon: Justyn Ly M.D.    Anesthesia: MAC, retrobulbar block    Estimated blood loss: None.    Implant: SHEILA model ZCB00 20.5 Diopter lens.    Complication: None.    Description of Procedure:  After informed consent was obtained, and operative site was marked, the patient was brought to the operative room swhere a retrobulbar block consisting of 2% lidocaine, 0.75% marcaine and vitrase was injected without complication.  The eye was prepped and draped in the standard ophthalmic fashion.  A paracentesis was made.  Lidocaine 1% non preserved was injected into the anterior chamber.  The anterior chamber was filled with viscoelastic. A beveled incision was made.  A cystotome and utrata forceps were used to create a continuous capsulorhexis.  The lens was hydrodissected from the capsular bag until it rotated freely.  The lens was phacoemulsified in the stop-and-chop method.  The cortex was aspirated out of the eye.  The capsular bag was quite fibrous centrally, I elected to leave that in place rather than risk the need for an anterior vitrectomy.  The above-named lens was injected into the capsular bag after it was re-inflated with viscoelastic.  The viscoelastic was aspirated out of the eye.  The wounds were hydrated and found to be water-tight.   1 mg (1mg/0.1cc in non preserved sterile BSS)of cefuroxime was injected into the anterior chamber.  A drop of 5% betadine was placed on the ocular surface.  A drop of gatifloxacin 0.5%, prednisolone acetate 1%, and Ketorolac were placed in the eye.  The eye was shielded.     The patient tolerated the procedure well, and left the operative in stable and satisfactory condition.  She will follow up tomorrow in the eye clinic.

## 2018-08-14 NOTE — DISCHARGE INSTRUCTIONS
[facility] Anesthesia [facility] Care [facility] Discharge  Instructions  Anesthesia (Eye Care Center)   Adult Discharge Instructions    For 24 hours after surgery    1. Get plenty of rest.  Make arrangements to have a responsible adult stay with you for at least 24 hours after you leave the hospital.  2. Do not drive or use heavy equipment for 24 hours.    3. Do not drink alcohol for 24 hours.  4. Do not sign legal documents or make important decisions for 24 hours.  5. Avoid strenuous or risky activities. You may feel lightheaded.  If so, sit for a few minutes before standing.  Have someone help you get up.   6. Conscious sedation patients may resume a regular diet..  7. Any questions of medical nature, call your physician.    Dr. Justyn Ly  [facility]  [phone]  Post Operative Cataract Instructions    If you have a clear eye shield on, you should wear the eye shield or glasses to protect the eye on the day of surgery and begin eye drops today as directed.             The clear eye shield should be worn overnight and brought to the clinic at your post op visit.    Do not rub the operated eye.    Light sensitivity may be noticed. Sunglasses may be worn for comfort.    Some discomfort and irritation may be noticed. Acetaminophen (Tylenol) or Ibuprofen (Advil) may be taken for discomfort. If pain persists please call Dr. Ly's office.    Keep the operated eye dry. You may wash your hair, bathe or shower, but keep the operated eye closed while doing so. No swimming pools, hot tubs, saunas, etc. for 10 days.    Use medication exactly as prescribed by your doctor. You may restart your regular home medications.    If your procedure included an ECP (Endoscopic Cyclophotocoagulation), you should take Diamox 500 mg at bedtime as directed by your physician.     Bring all eye medications with you to the clinic on your first post operative visit.    Call Dr. Ly's office if any of the following should  occur:      Any sudden vision changes    Nausea or severe headache    Increase in pain not controlled      Or signs of infection (pus, increasing redness or tenderness)        11/8/16

## 2018-08-14 NOTE — ANESTHESIA POSTPROCEDURE EVALUATION
Patient: Amada Hurd    Procedure(s):  RIGHT EYE PHACOEMULSIFICATION CLEAR CORNEA WITH STANDARD INTRAOCULAR LENS IMPLANT - Wound Class: I-Clean    Diagnosis:CATARACT  Diagnosis Additional Information: No value filed.    Anesthesia Type:  MAC    Note:  Anesthesia Post Evaluation    Patient location during evaluation: PACU  Patient participation: Able to fully participate in evaluation  Level of consciousness: awake  Pain management: adequate  Airway patency: patent  Cardiovascular status: acceptable  Respiratory status: acceptable  Hydration status: acceptable  PONV: controlled     Anesthetic complications: None          Last vitals:  Vitals:    08/14/18 0902 08/14/18 1032 08/14/18 1050   BP: 138/71 139/73 147/63   Resp: 16 16 16   Temp: 37.4  C (99.4  F)     SpO2: 100% 98% 97%         Electronically Signed By: Gabbie Saunders MD  August 14, 2018  1:39 PM

## 2018-08-14 NOTE — ANESTHESIA CARE TRANSFER NOTE
Patient: Amada Hurd    Procedure(s):  RIGHT EYE PHACOEMULSIFICATION CLEAR CORNEA WITH STANDARD INTRAOCULAR LENS IMPLANT - Wound Class: I-Clean    Diagnosis: CATARACT  Diagnosis Additional Information: No value filed.    Anesthesia Type:   MAC     Note:  Airway :Room Air  Patient transferred to:PACU  Comments: Transferred to Eye Center recovery room in recliner with armrests up, spontaneous respirations, O2 saturation maintained greater than 95% with oxygen via room air. All monitors and alarms on and functioning, clinically stable vital signs. Report given to recovery RN and questions answered. Patient alert and following verbal directions.Handoff Report: Identifed the Patient, Identified the Reponsible Provider, Reviewed the pertinent medical history, Discussed the surgical course, Reviewed Intra-OP anesthesia mangement and issues during anesthesia, Set expectations for post-procedure period and Allowed opportunity for questions and acknowledgement of understanding      Vitals: (Last set prior to Anesthesia Care Transfer)    CRNA VITALS  8/14/2018 0959 - 8/14/2018 1032      8/14/2018             Resp Rate (set): 10                Electronically Signed By: WENDY Lebron CRNA  August 14, 2018  10:32 AM

## 2018-08-14 NOTE — OR NURSING
Pt repetitive in asking questions, has some short term memory loss - all questions answered multiple times,  verbalizes understanding of post op instructions/eye drops.

## 2018-08-14 NOTE — IP AVS SNAPSHOT
[facility]    [address]    [place] [address]    Phone:  [phone]    Fax:  [phone]                                       After Visit Summary   8/14/2018    Amada Hurd    MRN: [id number]           After Visit Summary Signature Page     I have received my discharge instructions, and my questions have been answered. I have discussed any challenges I see with this plan with the nurse or doctor.    ..........................................................................................................................................  Patient/Patient Representative Signature      ..........................................................................................................................................  Patient Representative Print Name and Relationship to Patient    ..................................................               ................................................  Date                                            Time    ..........................................................................................................................................  Reviewed by Signature/Title    ...................................................              ..............................................  Date                                                            Time

## 2018-08-14 NOTE — OR NURSING
Post op instructions reviewed with pt and spouse. VSS, denies pain. Smart drop forgotten at home. All questions answered. D/C'd to home.

## 2018-08-14 NOTE — IP AVS SNAPSHOT
MRN:[id number]                      After Visit Summary   8/14/2018    Amada Hurd    MRN: [id number]           Thank you!     Thank you for choosing [facility] for your care. Our goal is always to provide you with excellent care. Hearing back from our patients is one way we can continue to improve our services. Please take a few minutes to complete the written survey that you may receive in the mail after you visit with us. Thank you!        Patient Information     Date Of Birth          1940        About your hospital stay     You were admitted on:  August 14, 2018 You last received care in the:  [facility]    You were discharged on:  August 14, 2018       Who to Call     For medical emergencies, please call 911.  For non-urgent questions about your medical care, please call your primary care provider or clinic, [phone]  For questions related to your surgery, please call your surgery clinic        Attending Provider     Provider Specialty    Justyn Ly MD Ophthalmology       Primary Care Provider Office Phone # Fax #    Ekta Solo [phone]      Further instructions from your care team       [facility] Anesthesia [facility] Care [facility] Discharge  Instructions  Anesthesia (Eye Care Center)   Adult Discharge Instructions    For 24 hours after surgery    1. Get plenty of rest.  Make arrangements to have a responsible adult stay with you for at least 24 hours after you leave the hospital.  2. Do not drive or use heavy equipment for 24 hours.    3. Do not drink alcohol for 24 hours.  4. Do not sign legal documents or make important decisions for 24 hours.  5. Avoid strenuous or risky activities. You may feel lightheaded.  If so, sit for a few minutes before standing.  Have someone help you get up.   6. Conscious sedation patients may resume a regular diet..  7. Any questions of medical nature, call your physician.    Dr. Alejandra  Aliyah  [facility]  [phone]  Post Operative Cataract Instructions    If you have a clear eye shield on, you should wear the eye shield or glasses to protect the eye on the day of surgery and begin eye drops today as directed.             The clear eye shield should be worn overnight and brought to the clinic at your post op visit.    Do not rub the operated eye.    Light sensitivity may be noticed. Sunglasses may be worn for comfort.    Some discomfort and irritation may be noticed. Acetaminophen (Tylenol) or Ibuprofen (Advil) may be taken for discomfort. If pain persists please call Dr. Ly's office.    Keep the operated eye dry. You may wash your hair, bathe or shower, but keep the operated eye closed while doing so. No swimming pools, hot tubs, saunas, etc. for 10 days.    Use medication exactly as prescribed by your doctor. You may restart your regular home medications.    If your procedure included an ECP (Endoscopic Cyclophotocoagulation), you should take Diamox 500 mg at bedtime as directed by your physician.     Bring all eye medications with you to the clinic on your first post operative visit.    Call Dr. Ly's office if any of the following should occur:      Any sudden vision changes    Nausea or severe headache    Increase in pain not controlled      Or signs of infection (pus, increasing redness or tenderness)        11/8/16    Pending Results     No orders found from 8/12/2018 to 8/15/2018.            Admission Information     Date & Time Provider Department Dept. Phone    8/14/2018 Justyn Ly MD [facility] [phone]      Your Vitals Were     Blood Pressure Temperature Respirations Pulse Oximetry          139/73 99.4  F (37.4  C) (Temporal) 16 98%        MyChart Information     [employer] gives you secure access to your electronic health record. If you see a primary care provider, you can also send messages to your care team and make appointments. If  you have questions, please call your primary care clinic.  If you do not have a primary care provider, please call [phone] and they will assist you.        Care EveryWhere ID     This is your Care EveryWhere ID. This could be used by other organizations to access your [facility] medical records  [id number]        Equal Access to Services     DEION CHURCH : Chuck Arredondo, waaxda luqadaha, qaybta kaleroy chamberlain, matilde kumar. So [facility] [phone].    ATENCIÓN: Si habla español, tiene a bradford disposición servicios gratuitos de asistencia lingüística. Llame al [phone].    We comply with applicable federal civil rights laws and [place] laws. We do not discriminate on the basis of race, color, national origin, age, disability, sex, sexual orientation, or gender identity.               Review of your medicines      UNREVIEWED medicines. Ask your doctor about these medicines        Dose / Directions    ANACIN 81 MG EC tablet   Generic drug:  aspirin        Dose:  81 mg   Take 81 mg by mouth daily   Refills:  0       calcium carbonate 500 MG tablet   Commonly known as:  OS- mg [place]. Ca        Dose:  500 mg   Take 500 mg by mouth daily   Refills:  0       COENZYME Q-10 PO        Dose:  100 mg   Take 100 mg by mouth   Refills:  0       estradiol 0.075 MG/24HR BIW patch   Commonly known as:  VIVELLE-DOT   Used for:  Menopausal syndrome (hot flashes)        APPLY 1 PATCH TRANSDERMALLY2 TIMES A WEEK   Quantity:  24 patch   Refills:  3       hydrOXYzine 25 MG capsule   Commonly known as:  VISTARIL   Used for:  Panic attack, Detached retina, right        Dose:  25 mg   Take 1 capsule (25 mg) by mouth 3 times daily as needed for anxiety (take one in morning, and then 1-2 times more during day as needed for panic)   Quantity:  90 capsule   Refills:  1       PRESERVISION AREDS 2 Caps        Refills:  0       TYLENOL PM EXTRA STRENGTH  MG tablet   Generic drug:   diphenhydrAMINE-acetaminophen        Dose:  1 tablet   Take 1 tablet by mouth At Bedtime   Refills:  0       VITAMIN D (CHOLECALCIFEROL) PO        Dose:  400 Units   Take 400 Units by mouth daily   Refills:  0                Protect others around you: Learn how to safely use, store and throw away your medicines at www.disposemymeds.org.             Medication List: This is a list of all your medications and when to take them. Check marks below indicate your daily home schedule. Keep this list as a reference.      Medications           Morning Afternoon Evening Bedtime As Needed    ANACIN 81 MG EC tablet   Take 81 mg by mouth daily   Generic drug:  aspirin                                calcium carbonate 500 MG tablet   Commonly known as:  OS- mg [place]. Ca   Take 500 mg by mouth daily                                COENZYME Q-10 PO   Take 100 mg by mouth                                estradiol 0.075 MG/24HR BIW patch   Commonly known as:  VIVELLE-DOT   APPLY 1 PATCH TRANSDERMALLY2 TIMES A WEEK                                hydrOXYzine 25 MG capsule   Commonly known as:  VISTARIL   Take 1 capsule (25 mg) by mouth 3 times daily as needed for anxiety (take one in morning, and then 1-2 times more during day as needed for panic)                                PRESERVISION AREDS 2 Caps                                TYLENOL PM EXTRA STRENGTH  MG tablet   Take 1 tablet by mouth At Bedtime   Generic drug:  diphenhydrAMINE-acetaminophen                                VITAMIN D (CHOLECALCIFEROL) PO   Take 400 Units by mouth daily

## 2018-11-30 DIAGNOSIS — N95.1 MENOPAUSAL SYNDROME (HOT FLASHES): ICD-10-CM

## 2018-11-30 NOTE — TELEPHONE ENCOUNTER
"Requested Prescriptions   Pending Prescriptions Disp Refills     estradiol (VIVELLE-DOT) 0.075 MG/24HR BIW patch [Pharmacy Med Name: ESTRADIOL(V) DIS 0.075/24]  Last Written Prescription Date:  1/12/2018  Last Fill Quantity: 24 patch,  # refills: 3   Last Office Visit: 8/1/2018   Future Office Visit:      24 patch 3     Sig: APPLY 1 PATCH TRANSDERMALLY2 TIMES A WEEK    Hormone Replacement Therapy Failed    11/30/2018  1:44 AM       Failed - Blood pressure under 140/90 in past 12 months    BP Readings from Last 3 Encounters:   08/14/18 147/63   08/01/18 132/70   05/16/18 141/71                Passed - Recent (12 mo) or future (30 days) visit within the authorizing provider's specialty    Patient had office visit in the last 12 months or has a visit in the next 30 days with authorizing provider or within the authorizing provider's specialty.  See \"Patient Info\" tab in inbasket, or \"Choose Columns\" in Meds & Orders section of the refill encounter.             Passed - Patient is 18 years of age or older       Passed - No active pregnancy on record       Passed - No positive pregnancy test on record in past 12 months          "

## 2018-12-03 RX ORDER — ESTRADIOL 0.07 MG/D
FILM, EXTENDED RELEASE TRANSDERMAL
Start: 2018-12-03

## 2018-12-17 DIAGNOSIS — N95.1 MENOPAUSAL SYNDROME (HOT FLASHES): ICD-10-CM

## 2018-12-18 RX ORDER — ESTRADIOL 0.07 MG/D
FILM, EXTENDED RELEASE TRANSDERMAL
Qty: 8 PATCH | Refills: 0 | Status: SHIPPED | OUTPATIENT
Start: 2018-12-18 | End: 2019-01-04

## 2018-12-18 NOTE — TELEPHONE ENCOUNTER
"Medication is being filled for 1 time refill only due to:  Patient needs to be seen because Due for physical in January.   Last 1/12/18  Michelle Draper RN    Requested Prescriptions   Signed Prescriptions Disp Refills     estradiol (VIVELLE-DOT) 0.075 MG/24HR BIW patch 8 patch 0     Sig: APPLY 1 PATCH TRANSDERMALLY2 TIMES A WEEK    Hormone Replacement Therapy Failed - 12/17/2018 11:33 AM       Failed - Blood pressure under 140/90 in past 12 months    BP Readings from Last 3 Encounters:   08/14/18 147/63   08/01/18 132/70   05/16/18 141/71                Passed - Recent (12 mo) or future (30 days) visit within the authorizing provider's specialty    Patient had office visit in the last 12 months or has a visit in the next 30 days with authorizing provider or within the authorizing provider's specialty.  See \"Patient Info\" tab in inbasket, or \"Choose Columns\" in Meds & Orders section of the refill encounter.             Passed - Patient is 18 years of age or older       Passed - No active pregnancy on record       Passed - No positive pregnancy test on record in past 12 months            "

## 2019-01-04 DIAGNOSIS — N95.1 MENOPAUSAL SYNDROME (HOT FLASHES): ICD-10-CM

## 2019-01-04 RX ORDER — ESTRADIOL 0.07 MG/D
FILM, EXTENDED RELEASE TRANSDERMAL
Qty: 8 PATCH | Refills: 0 | Status: SHIPPED | OUTPATIENT
Start: 2019-01-04 | End: 2019-01-24

## 2019-01-04 NOTE — TELEPHONE ENCOUNTER
Routing refill request to provider for review/approval because:  BPS OUT OF RANGE.  ALSO DUE FOR A PHYSICAL - SENT [facility] MESSAGE AND ADDED IN PHARM COMMENTS.  Please authorize if appropriate.  Thanks,  Sarai Clayton RN

## 2019-01-04 NOTE — TELEPHONE ENCOUNTER
"Requested Prescriptions   Pending Prescriptions Disp Refills     estradiol (VIVELLE-DOT) 0.075 MG/24HR BIW patch [Pharmacy Med Name: ESTRADIOL(V) DIS 0.075/24] 8 patch 0     Sig: APPLY 1 PATCH TRANSDERMALLY2 TIMES A WEEK. DUE FOR    PHYSICAL IN JANUARY.    Hormone Replacement Therapy Failed - 1/4/2019 12:14 AM       Failed - Blood pressure under 140/90 in past 12 months    BP Readings from Last 3 Encounters:   08/14/18 147/63   08/01/18 132/70   05/16/18 141/71                Passed - Recent (12 mo) or future (30 days) visit within the authorizing provider's specialty    Patient had office visit in the last 12 months or has a visit in the next 30 days with authorizing provider or within the authorizing provider's specialty.  See \"Patient Info\" tab in inbasket, or \"Choose Columns\" in Meds & Orders section of the refill encounter.             Passed - Patient is 18 years of age or older       Passed - No active pregnancy on record       Passed - No positive pregnancy test on record in past 12 months        "

## 2019-01-18 ENCOUNTER — OFFICE VISIT (OUTPATIENT)
Dept: FAMILY MEDICINE | Facility: CLINIC | Age: 79
End: 2019-01-18
Payer: MEDICARE

## 2019-01-18 VITALS
HEART RATE: 64 BPM | BODY MASS INDEX: 19.63 KG/M2 | DIASTOLIC BLOOD PRESSURE: 78 MMHG | SYSTOLIC BLOOD PRESSURE: 162 MMHG | RESPIRATION RATE: 14 BRPM | TEMPERATURE: 98.2 F | HEIGHT: 64 IN | WEIGHT: 115 LBS | OXYGEN SATURATION: 99 %

## 2019-01-18 DIAGNOSIS — E78.5 HYPERLIPIDEMIA LDL GOAL <160: ICD-10-CM

## 2019-01-18 DIAGNOSIS — N95.1 MENOPAUSAL SYNDROME (HOT FLASHES): ICD-10-CM

## 2019-01-18 DIAGNOSIS — H33.21 DETACHED RETINA, RIGHT: ICD-10-CM

## 2019-01-18 DIAGNOSIS — Z23 NEED FOR TDAP VACCINATION: ICD-10-CM

## 2019-01-18 DIAGNOSIS — F41.0 PANIC ANXIETY SYNDROME: ICD-10-CM

## 2019-01-18 DIAGNOSIS — R41.3 MEMORY CHANGE: ICD-10-CM

## 2019-01-18 DIAGNOSIS — R03.0 ELEVATED BP WITHOUT DIAGNOSIS OF HYPERTENSION: ICD-10-CM

## 2019-01-18 DIAGNOSIS — Z00.00 ENCOUNTER FOR ROUTINE ADULT HEALTH EXAMINATION WITHOUT ABNORMAL FINDINGS: Primary | ICD-10-CM

## 2019-01-18 PROCEDURE — 90715 TDAP VACCINE 7 YRS/> IM: CPT | Performed by: FAMILY MEDICINE

## 2019-01-18 PROCEDURE — 90471 IMMUNIZATION ADMIN: CPT | Performed by: FAMILY MEDICINE

## 2019-01-18 PROCEDURE — 36415 COLL VENOUS BLD VENIPUNCTURE: CPT | Performed by: FAMILY MEDICINE

## 2019-01-18 PROCEDURE — 82947 ASSAY GLUCOSE BLOOD QUANT: CPT | Performed by: FAMILY MEDICINE

## 2019-01-18 PROCEDURE — G0439 PPPS, SUBSEQ VISIT: HCPCS | Performed by: FAMILY MEDICINE

## 2019-01-18 PROCEDURE — 80061 LIPID PANEL: CPT | Performed by: FAMILY MEDICINE

## 2019-01-18 RX ORDER — BRIMONIDINE TARTRATE 2 MG/ML
1 SOLUTION/ DROPS OPHTHALMIC 2 TIMES DAILY
COMMUNITY
Start: 2018-11-30 | End: 2021-02-09

## 2019-01-18 RX ORDER — MULTIVITAMIN WITH IRON
1 TABLET ORAL DAILY
COMMUNITY

## 2019-01-18 RX ORDER — ESTRADIOL 0.07 MG/D
FILM, EXTENDED RELEASE TRANSDERMAL
Qty: 24 PATCH | Refills: 3 | Status: SHIPPED | OUTPATIENT
Start: 2019-01-18 | End: 2019-02-01

## 2019-01-18 RX ORDER — ATROPINE SULFATE 10 MG/ML
1 SOLUTION/ DROPS OPHTHALMIC 2 TIMES DAILY
COMMUNITY
Start: 2018-11-30 | End: 2021-02-09

## 2019-01-18 RX ORDER — ESTRADIOL 0.07 MG/D
FILM, EXTENDED RELEASE TRANSDERMAL
Qty: 8 PATCH | Refills: 0 | Status: CANCELLED | OUTPATIENT
Start: 2019-01-18

## 2019-01-18 RX ORDER — ACETAMINOPHEN 325 MG/1
325-650 TABLET ORAL PRN
COMMUNITY

## 2019-01-18 RX ORDER — DIPHENOXYLATE HYDROCHLORIDE AND ATROPINE SULFATE 2.5; .025 MG/1; MG/1
2 TABLET ORAL DAILY
COMMUNITY

## 2019-01-18 ASSESSMENT — ACTIVITIES OF DAILY LIVING (ADL): CURRENT_FUNCTION: NO ASSISTANCE NEEDED

## 2019-01-18 ASSESSMENT — MIFFLIN-ST. JEOR: SCORE: 992.67

## 2019-01-18 NOTE — NURSING NOTE
"Chief Complaint   Patient presents with     Wellness Visit       Initial /81   Pulse 74   Temp 98.2  F (36.8  C) (Oral)   Resp 14   Ht 1.619 m (5' 3.75\")   Wt 52.2 kg (115 lb)   SpO2 100%   BMI 19.89 kg/m   Estimated body mass index is 19.89 kg/m  as calculated from the following:    Height as of this encounter: 1.619 m (5' 3.75\").    Weight as of this encounter: 52.2 kg (115 lb).  BP completed using cuff size: regular    Health Maintenance that is potentially due pending provider review:  Health Maintenance Due   Topic Date Due     DTAP/TDAP/TD IMMUNIZATION (1 - Tdap) 03/23/1967     ZOSTER IMMUNIZATION (1 of 2) 03/23/1992     FALL RISK ASSESSMENT  01/12/2019     PHQ-2 Q1 YR  01/12/2019         Fall risk done  Shingles vaccine today    "

## 2019-01-18 NOTE — PROGRESS NOTES
"SUBJECTIVE:   Amada Hurd is a 78 year old female who presents for Preventive Visit.  Are you in the first 12 months of your Medicare coverage?  No    Annual Wellness Visit     In general, how would you rate your overall health?  Good    Frequency of exercise:  1 day/week    Duration of exercise:  Less than 15 minutes    Do you usually eat at least 4 servings of fruit and vegetables a day, include whole grains    & fiber and avoid regularly eating high fat or \"junk\" foods?  Yes    Taking medications regularly:  Yes    Medication side effects:  None    Ability to successfully perform activities of daily living:  No assistance needed    Home Safety:  No safety concerns identified    Hearing Impairment:  No hearing concerns    In the past 6 months, have you been bothered by leaking of urine?  No    In general, how would you rate your overall mental or emotional health?  Good    PHQ-2 Total Score: 0    Additional concerns today:  Yes    ---Detached R retina in 2/18- still recovering.  Sees Dr. Mo Drake, [facility], for detached Retina (in 2/18, s/p surgery in [place]).  She does feel that her eye is healing slowly- vision of R eye is still abnormal, but getting better, can see top half of visual field now.      --Lipids- had discuss last time about statin medications (~5/18), but wanted to think about it.  Hasn't thought about it much.  No real dietary changes since that visit.  Hasn't done any exercise since the eye surgery ~2 months ago.  Just yesterday got the okay to do yoga, stationary treadmill again.  Is fasting for lipid recheck today.    --HRT- She would like to continue on the vivelle-dot HRT.  She doesn't always put them on 2x/wk, sometimes just once a week.  Sx's return if she doesn't use them, though, so wants to stay on them.    --Panic attacks- better lately, but occasionally come on- more if going to unfamiliar places.  Depth perception off, so gets nervous walking places.    --Memory- see " testing below.  She thinks her vision being off has affected her memory/thinking some as above.  Feels like her memory has been worse since the eye surgery.  Pays the bills (but  does most), cooks, drives usually, but not with her vision worse now.  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   just retired, which can make her a bit anxious.   hasn't notice her memory changing or being worse- states they both remind each ther of things here and there.      Do you feel safe in your environment? Yes    Do you have a Health Care Directive? Yes: Advance Directive has been received and scanned.    .Sees Dr. Mo Drake, [facility], for detached Retina  Fall risk-yes-1x with injury-above  Fallen 2 or more times in the past year?: No  Any fall with injury in the past year?: Yes    Cognitive Screening   1) Repeat 3 items (Leader, Season, Table)    2) Clock draw: ABNORMAL   3) 3 item recall: Recalls 2 objects   Results: ABNORMAL clock, 1-2 items recalled: PROBABLE COGNITIVE IMPAIRMENT, **INFORM PROVIDER**    Mini-CogTM Copyright FAISAL Traore. Licensed by the author for use in [facility]; reprinted with permission (mishel@.[email or web address]). All rights reserved.        Six Item Cognitive Impairment Test   (6CIT):      What year is it?                               Correct - 0 points    What month is it?                               Correct - 0 points      Give the patient an address to remember with five components:   [place] Babb ( first and last name - 2 components)   [address]  (number and name of street - 2 components)   [place] ( city - 1 component)      About what time is it (within the hour)? Correct - 0 points    Count backwards from 20 to 1:   Correct - 0 points    Say the months of the year in reverse: One error - 2 points    Repeat the address phrase:   All wrong - 10 points    Total 6CIT Score:      12/28    Interpretation: The 6CIT uses an inverse score and questions are  weighted to produce a total out of 28. Scores of 0-7 are considered normal and 8 or more significant.    Advantages The test has high sensitivity without compromising specificity even in mild dementia. It is easy to translate linguistically and culturally.  Disadvantages The main disadvantage is in the scoring and weighting of the test, which is initially confusing, however computer models have simplified this greatly.    Probability Statistics: At the 7/8 cut off: Overall figures sensitivity 90% specificity 100%, in mild dementia sensitivity = 78% , specificity = 100%    Copyright 2000 [facility], [facility]. Courtesy of Dr. Shaq Jc       Do you have sleep apnea, excessive snoring or daytime drowsiness?: no      Reviewed and updated as needed this visit by clinical staff  Tobacco  Allergies  Meds  Med Hx  Surg Hx  Fam Hx  Soc Hx         Reviewed and updated as needed this visit by Provider        Social History     Tobacco Use     Smoking status: Never Smoker     Smokeless tobacco: Never Used   Substance Use Topics     Alcohol use: Yes     Alcohol/week: 0.0 oz     Comment: 1 glass of wine- 2-3 times a week       Alcohol Use 1/18/2019   If you drink alcohol do you typically have greater than 3 drinks per day OR greater than 7 drinks per week? No   AUDIT SCORE  -             Current providers sharing in care for this patient include:   Patient Care Team:  Ekta Solo MD as PCP - General (Family Practice)  Ekta Solo MD as PCP - Assigned PCP    The following health maintenance items are reviewed in Epic and correct as of today:  Health Maintenance   Topic Date Due     ZOSTER IMMUNIZATION (1 of 2) 03/23/1992     FALL RISK ASSESSMENT  01/18/2020     PHQ-2 Q1 YR  01/18/2020     ADVANCE DIRECTIVE PLANNING Q5 YRS  02/28/2021     LIPID SCREEN Q5 YR FEMALE (SYSTEM ASSIGNED)  01/18/2024     DTAP/TDAP/TD IMMUNIZATION (2 - Td) 01/18/2029     DEXA SCAN SCREENING (SYSTEM  ASSIGNED)  Completed     INFLUENZA VACCINE  Completed     PNEUMOVAX IMMUNIZATION 65+ LOW/MEDIUM RISK  Completed     IPV IMMUNIZATION  Aged Out     MENINGITIS IMMUNIZATION  Aged Out       Labs reviewed in EPIC  BP Readings from Last 3 Encounters:   01/18/19 162/78   08/14/18 147/63   08/01/18 132/70    Wt Readings from Last 3 Encounters:   01/18/19 52.2 kg (115 lb)   08/01/18 52.8 kg (116 lb 6.4 oz)   05/16/18 52.7 kg (116 lb 1.6 oz)                  Patient Active Problem List   Diagnosis     Hyperlipidemia LDL goal <160     Sciatica     Lumbar disc disease     Fractured coccyx (H)     Advance Care Planning     Menopausal syndrome (hot flashes)     Raynaud disease     Detached retina, right     Past Surgical History:   Procedure Laterality Date     APPENDECTOMY       CYSTOCELE REPAIR       [facility] OR OCULAR DEVICE INTRAOP DETACHED RETINA  02/22/2018    Dr. López Alcala     HYSTERECTOMY      ? part of left ovary in     PHACOEMULSIFICATION CLEAR CORNEA WITH STANDARD INTRAOCULAR LENS IMPLANT Right 8/14/2018    Procedure: PHACOEMULSIFICATION CLEAR CORNEA WITH STANDARD INTRAOCULAR LENS IMPLANT;  RIGHT EYE PHACOEMULSIFICATION CLEAR CORNEA WITH STANDARD INTRAOCULAR LENS IMPLANT;  Surgeon: Justyn Ly MD;  Location:  [facility]     REPAIR BLADDER         Social History     Tobacco Use     Smoking status: Never Smoker     Smokeless tobacco: Never Used   Substance Use Topics     Alcohol use: Yes     Alcohol/week: 0.0 oz     Comment: 1 glass of wine- 2-3 times a week     Family History   Problem Relation Age of Onset     Hypertension Father      Cancer Other      Alzheimer Disease Other          Current Outpatient Medications   Medication Sig Dispense Refill     acetaminophen (TYLENOL) 325 MG tablet Take 325-650 mg by mouth as needed       aspirin (ANACIN) 81 MG [facility] tablet Take 81 mg by mouth daily       calcium carbonate (OS- MG [place]) 500 MG tablet Take 500 mg by mouth daily       COENZYME Q-10 PO Take 100 mg by  mouth       diphenhydrAMINE-acetaminophen (TYLENOL PM EXTRA STRENGTH)  MG tablet Take 1 tablet by mouth At Bedtime       estradiol (VIVELLE-DOT) 0.075 MG/24HR BIW patch APPLY 1 PATCH TRANSDERMALLY2 TIMES A WEEK 24 patch 3     estradiol (VIVELLE-DOT) 0.075 MG/24HR BIW patch APPLY 1 PATCH TRANSDERMALLY2 TIMES A WEEK. DUE FOR    PHYSICAL IN JANUARY. 8 patch 0     magnesium 250 MG tablet Take 1 tablet by mouth daily       Multiple Vitamin (MULTI-VITAMINS) TABS Take 2 tablets by mouth daily       Multiple Vitamins-Minerals (PRESERVISION AREDS 2) CAPS        Omega-3 Fatty Acids (FISH OIL OMEGA-3 PO) Take 227 mg by mouth Takes 2 daily       UNABLE TO FIND Take 500 mg by mouth daily MEDICATION NAME: Tumeric       UNABLE TO FIND Take 2 tablets by mouth daily MEDICATION NAME: Mobility helper supplement       UNABLE TO FIND Take 2 capsules by mouth daily MEDICATION NAME: Vitalzem supplement       VITAMIN D, CHOLECALCIFEROL, PO Take 1,000 Units by mouth daily Taking 2 caps once daily (2,000 mg)       atropine 1 % ophthalmic solution Apply 1 drop to eye 2 times daily       brimonidine (ALPHAGAN) 0.2 % ophthalmic solution Apply 1 drop to eye 2 times daily       hydrOXYzine (VISTARIL) 25 MG capsule Take 1 capsule (25 mg) by mouth 3 times daily as needed for anxiety (take one in morning, and then 1-2 times more during day as needed for panic) (Patient not taking: Reported on 1/18/2019) 90 capsule 1     Allergies   Allergen Reactions     Codeine Nausea and Vomiting     Recent Labs   Lab Test 01/18/19  1246 01/12/18  1233 12/03/15  0942 10/16/14  1042 09/16/13   * 156* 154* 154* 132   HDL 79 85 73 78 75   TRIG 94 106 97 60 82   ALT  --   --   --   --  15   CR  --   --  0.68 0.70 0.67   GFRESTIMATED  --   --  85 82 89   GFRESTBLACK  --   --  >90   GFR Calc   >90 102   POTASSIUM  --   --  3.9 3.7 4.6      Pneumonia Vaccines- completed  Mammogram Screening: Patient over age 75, has elected to continue with  "mammography screening.  Last 3 Pap and HPV Results:      Review of Systems  Constitutional, HEENT, cardiovascular, pulmonary, gi and gu systems are negative, except as otherwise noted.    OBJECTIVE:   /78   Pulse 64   Temp 98.2  F (36.8  C) (Oral)   Resp 14   Ht 1.619 m (5' 3.75\")   Wt 52.2 kg (115 lb)   SpO2 99%   Breastfeeding? No   BMI 19.89 kg/m   Estimated body mass index is 19.89 kg/m  as calculated from the following:    Height as of this encounter: 1.619 m (5' 3.75\").    Weight as of this encounter: 52.2 kg (115 lb).  Physical Exam  GENERAL APPEARANCE: healthy, alert and no distress  EYES: Eyes grossly normal to inspection, PERRL and conjunctivae and sclerae normal  HENT: ear canals and TM's normal, nose and mouth without ulcers or lesions, oropharynx clear and oral mucous membranes moist  NECK: no adenopathy, no asymmetry, masses, or scars and thyroid normal to palpation  RESP: lungs clear to auscultation - no rales, rhonchi or wheezes  CV: regular rate and rhythm, normal S1 S2, no S3 or S4, no murmur, click or rub, no peripheral edema and peripheral pulses strong  ABDOMEN: soft, nontender, no hepatosplenomegaly, no masses and bowel sounds normal  MS: no musculoskeletal defects are noted and gait is age appropriate without ataxia  SKIN: no suspicious lesions or rashes  NEURO: Normal strength and tone, sensory exam grossly normal, mentation intact and speech normal  PSYCH: mentation appears normal and affect normal/bright    Diagnostic Test Results:  Results for orders placed or performed in visit on 01/18/19   Lipid panel reflex to direct LDL Fasting   Result Value Ref Range    Cholesterol 272 (H) <200 mg/dL    Triglycerides 94 <150 mg/dL    HDL Cholesterol 79 >49 mg/dL    LDL Cholesterol Calculated 174 (H) <100 mg/dL    Non HDL Cholesterol 193 (H) <130 mg/dL   Glucose   Result Value Ref Range    Glucose 93 70 - 99 mg/dL       ASSESSMENT / PLAN:       ICD-10-CM    1. Encounter for routine " "adult health examination without abnormal findings Z00.00 Glucose   2. Elevated BP without diagnosis of hypertension R03.0    3. Memory change R41.3    4. Detached retina, right H33.21    5. Hyperlipidemia LDL goal <160 E78.5 Lipid panel reflex to direct LDL Fasting   6. Menopausal syndrome (hot flashes) N95.1 estradiol (VIVELLE-DOT) 0.075 MG/24HR BIW patch   7. Panic anxiety syndrome F41.0    8. Need for Tdap vaccination Z23 TDAP, IM (10 - 64 YRS) - Adacel     CPE- Discussed diet, calcium and exercise.  Went over Self Breast Exam.  Thin prep pap was not done.  Eyes and Teeth or UTD or recommended f/u.  Tdap vaccination needed today- risks/benefits discussed, given today.  See orders below for tests ordered and screening needed.      Elevated BP/lipids - BP still high at same level at the end of the visit.  Rec f/u in 2-4 weeks for BP recheck and discussion of lipids after new levels are back.    Memory- testing a bit off today, but pt and  have not been concerned.  Pt thinks her thinking/memory is a bit off due to her vision issues s/p detached retina, but they will both be aware of changes.  Still having some anxiety/panic sx's as well, but did not seem overly concerned about these sx's today.  Will also f/u at next visit.    Detached retina- cont f/u with eye specialist.  Vision improving, but still off.      End of Life Planning:  Patient currently has an advanced directive: Yes.  Practitioner is supportive of decision.    COUNSELING:  Reviewed preventive health counseling, as reflected in patient instructions    BP Readings from Last 1 Encounters:   01/18/19 162/78     Estimated body mass index is 19.89 kg/m  as calculated from the following:    Height as of this encounter: 1.619 m (5' 3.75\").    Weight as of this encounter: 52.2 kg (115 lb).    BP Screening:   Last 3 BP Readings:    BP Readings from Last 3 Encounters:   01/18/19 162/78   08/14/18 147/63   08/01/18 132/70       The following was " recommended to the patient:  Re-screen within 4 weeks and recommend lifestyle modifications  Weight management plan noted, stable and monitoring     reports that  has never smoked. she has never used smokeless tobacco.      Appropriate preventive services were discussed with this patient, including applicable screening as appropriate for cardiovascular disease, diabetes, osteopenia/osteoporosis, and glaucoma.  As appropriate for age/gender, discussed screening for colorectal cancer, prostate cancer, breast cancer, and cervical cancer. Checklist reviewing preventive services available has been given to the patient.    Reviewed patients plan of care and provided an AVS. The Basic Care Plan (routine screening as documented in Health Maintenance) for Amada meets the Care Plan requirement. This Care Plan has been established and reviewed with the Patient.    Counseling Resources:  ATP IV Guidelines  Pooled Cohorts Equation Calculator  Breast Cancer Risk Calculator  FRAX Risk Assessment  ICSI Preventive Guidelines  Dietary Guidelines for Americans, 2010  USDA's MyPlate  ASA Prophylaxis  Lung CA Screening    Ekta Solo MD  [facility]

## 2019-01-19 LAB
CHOLEST SERPL-MCNC: 272 MG/DL
GLUCOSE SERPL-MCNC: 93 MG/DL (ref 70–99)
HDLC SERPL-MCNC: 79 MG/DL
LDLC SERPL CALC-MCNC: 174 MG/DL
NONHDLC SERPL-MCNC: 193 MG/DL
TRIGL SERPL-MCNC: 94 MG/DL

## 2019-01-24 DIAGNOSIS — N95.1 MENOPAUSAL SYNDROME (HOT FLASHES): ICD-10-CM

## 2019-01-25 RX ORDER — ESTRADIOL 0.07 MG/D
FILM, EXTENDED RELEASE TRANSDERMAL
Qty: 24 PATCH | Refills: 3 | Status: SHIPPED | OUTPATIENT
Start: 2019-01-25 | End: 2020-01-24 | Stop reason: DRUGHIGH

## 2019-01-25 NOTE — TELEPHONE ENCOUNTER
"Routing refill request to provider for review/approval because:  Recent B/Ps above 140/90 goal  Ashley MCPHERSON, RN    Requested Prescriptions   Pending Prescriptions Disp Refills     estradiol (VIVELLE-DOT) 0.075 MG/24HR BIW patch [Pharmacy Med Name: ESTRADIOL(V) DIS 0.075/24] 8 patch 0     Sig: APPLY 1 PATCH TRANSDERMALLY2 TIMES A WEEK . DUE FOR   ANNUAL PHYSICAL    Hormone Replacement Therapy Failed - 1/24/2019  1:10 PM       Failed - Blood pressure under 140/90 in past 12 months    BP Readings from Last 3 Encounters:   01/18/19 162/78   08/14/18 147/63   08/01/18 132/70                Passed - Recent (12 mo) or future (30 days) visit within the authorizing provider's specialty    Patient had office visit in the last 12 months or has a visit in the next 30 days with authorizing provider or within the authorizing provider's specialty.  See \"Patient Info\" tab in inbasket, or \"Choose Columns\" in Meds & Orders section of the refill encounter.             Passed - Medication is active on med list       Passed - Patient is 18 years of age or older       Passed - No active pregnancy on record       Passed - No positive pregnancy test on record in past 12 months        Next 5 appointments (look out 90 days)    Feb 01, 2019  1:15 PM CST  Office Visit with Ekta Solo MD  [facility] ([facility]) [address] [address]  [phone]          "

## 2019-01-25 NOTE — TELEPHONE ENCOUNTER
Sent full rx at her appt on 1/18/19, but look like it was a different location? Sent in same rx to this location.  Thanks- CW

## 2019-02-01 ENCOUNTER — OFFICE VISIT (OUTPATIENT)
Dept: FAMILY MEDICINE | Facility: CLINIC | Age: 79
End: 2019-02-01
Payer: MEDICARE

## 2019-02-01 VITALS
HEIGHT: 64 IN | WEIGHT: 117 LBS | TEMPERATURE: 97.9 F | BODY MASS INDEX: 19.97 KG/M2 | SYSTOLIC BLOOD PRESSURE: 145 MMHG | RESPIRATION RATE: 14 BRPM | DIASTOLIC BLOOD PRESSURE: 77 MMHG | HEART RATE: 71 BPM | OXYGEN SATURATION: 98 %

## 2019-02-01 DIAGNOSIS — R03.0 ELEVATED BP WITHOUT DIAGNOSIS OF HYPERTENSION: ICD-10-CM

## 2019-02-01 DIAGNOSIS — E78.5 HYPERLIPIDEMIA LDL GOAL <160: Primary | ICD-10-CM

## 2019-02-01 PROCEDURE — 99214 OFFICE O/P EST MOD 30 MIN: CPT | Performed by: FAMILY MEDICINE

## 2019-02-01 ASSESSMENT — MIFFLIN-ST. JEOR: SCORE: 1001.74

## 2019-02-01 NOTE — NURSING NOTE
"Chief Complaint   Patient presents with     RECHECK     follow up lab results, possible medication       Initial /77   Pulse 71   Temp 97.9  F (36.6  C) (Oral)   Resp 14   Ht 1.619 m (5' 3.75\")   Wt 53.1 kg (117 lb)   SpO2 98%   BMI 20.24 kg/m   Estimated body mass index is 20.24 kg/m  as calculated from the following:    Height as of this encounter: 1.619 m (5' 3.75\").    Weight as of this encounter: 53.1 kg (117 lb).  BP completed using cuff size: regular    Health Maintenance that is potentially due pending provider review:  Health Maintenance Due   Topic Date Due     ZOSTER IMMUNIZATION (1 of 2) 03/23/1992         On waiting list at pharmacy for shingles vaccine  "

## 2019-02-01 NOTE — PROGRESS NOTES
SUBJECTIVE:   Amada Hurd is a 78 year old female who presents to clinic today for the following health issues:    Chief Complaint   Patient presents with     RECHECK     follow up lab results, possible medication     Lipids-   Recent Labs   Lab Test 01/18/19  1246 01/12/18  1233  10/16/14  1042   CHOL 272* 262*   < > 244*   HDL 79 85   < > 78   * 156*   < > 154*   TRIG 94 106   < > 60   CHOLHDLRATIO  --   --   --  3.1    < > = values in this interval not displayed.      The 10-year ASCVD risk score (Brenda DOCKERY Jr., et al., 2013) is: 22.9%    Values used to calculate the score:      Age: 78 years      Sex: Female      Is Non- : No      Diabetic: No      Tobacco smoker: No      Systolic Blood Pressure: 145 mmHg      Is BP treated: No      HDL Cholesterol: 79 mg/dL      Total Cholesterol: 272 mg/dL     Diet-   Breakfast- cheerios, berries, coffee w honey  Lunch- salad or sandwich, usually salad with lots of vegetables (oil and vinegar)  Cheese/crackers prior to dinner ~5pm.  [place] crackers and thin slice of cheese.  Dinner- chicken, salmon, occasionally steak or hamburger or lamb chops.  Vegetables- green and either potato or sweet potatoes.  Not a bit dessert person.  Occasionally lemon cake- usually at noon.      Exercise- back on treadmill and yoga.  Less exercise since 9/1 due to eye injury.    Going to [place] on 2/9/18 until May.    Eye doctor on Friday- if she gets approval, she can go then.      Problem list and histories reviewed & adjusted, as indicated.  Additional history: as documented      Patient Active Problem List   Diagnosis     Hyperlipidemia LDL goal <160     Sciatica     Lumbar disc disease     Fractured coccyx (H)     Advance Care Planning     Menopausal syndrome (hot flashes)     Raynaud disease     Detached retina, right     Past Surgical History:   Procedure Laterality Date     APPENDECTOMY       CYSTOCELE REPAIR       [facility] OR OCULAR DEVICE INTRAOP DETACHED  RETINA  02/22/2018    Dr. López Alcala     HYSTERECTOMY      ? part of left ovary in     PHACOEMULSIFICATION CLEAR CORNEA WITH STANDARD INTRAOCULAR LENS IMPLANT Right 8/14/2018    Procedure: PHACOEMULSIFICATION CLEAR CORNEA WITH STANDARD INTRAOCULAR LENS IMPLANT;  RIGHT EYE PHACOEMULSIFICATION CLEAR CORNEA WITH STANDARD INTRAOCULAR LENS IMPLANT;  Surgeon: Justyn Ly MD;  Location:  [facility]     REPAIR BLADDER         Social History     Tobacco Use     Smoking status: Never Smoker     Smokeless tobacco: Never Used   Substance Use Topics     Alcohol use: Yes     Alcohol/week: 0.0 oz     Comment: 1 glass of wine- 2-3 times a week     Family History   Problem Relation Age of Onset     Hypertension Father      Cancer Other      Alzheimer Disease Other          Current Outpatient Medications   Medication Sig Dispense Refill     acetaminophen (TYLENOL) 325 MG tablet Take 325-650 mg by mouth as needed       aspirin (ANACIN) 81 MG [facility] tablet Take 81 mg by mouth daily       atropine 1 % ophthalmic solution Place 1 drop into the right eye 2 times daily        brimonidine (ALPHAGAN) 0.2 % ophthalmic solution Place 1 drop into the right eye 2 times daily        Calcium 250 MG CAPS Take 250 mg by mouth daily        COENZYME Q-10 PO Take 100 mg by mouth       diphenhydrAMINE-acetaminophen (TYLENOL PM EXTRA STRENGTH)  MG tablet Take 1 tablet by mouth At Bedtime Take 1-2 tabs PRN at bedtime       estradiol (VIVELLE-DOT) 0.075 MG/24HR BIW patch APPLY 1 PATCH TRANSDERMALLY2 TIMES A WEEK . DUE FOR   ANNUAL PHYSICAL 24 patch 3     hydrOXYzine (VISTARIL) 25 MG capsule Take 1 capsule (25 mg) by mouth 3 times daily as needed for anxiety (take one in morning, and then 1-2 times more during day as needed for panic) 90 capsule 1     magnesium 250 MG tablet Take 1 tablet by mouth daily       Multiple Vitamin (MULTI-VITAMINS) TABS Take 2 tablets by mouth daily       Multiple Vitamins-Minerals (PRESERVISION AREDS 2) CAPS         "Omega-3 Fatty Acids (FISH OIL OMEGA-3 PO) Take 227 mg by mouth Takes 2 daily       UNABLE TO FIND Take 500 mg by mouth daily MEDICATION NAME: Tumeric       UNABLE TO FIND Take 2 tablets by mouth daily MEDICATION NAME: Mobility helper supplement       UNABLE TO FIND Take 2 capsules by mouth daily MEDICATION NAME: Vitalzem supplement       VITAMIN D, CHOLECALCIFEROL, PO Take 1,000 Units by mouth daily Taking 2 caps once daily (2,000 mg)       Allergies   Allergen Reactions     Codeine Nausea and Vomiting     Recent Labs   Lab Test 01/18/19  1246 01/12/18  1233 12/03/15  0942 10/16/14  1042 09/16/13   * 156* 154* 154* 132   HDL 79 85 73 78 75   TRIG 94 106 97 60 82   ALT  --   --   --   --  15   CR  --   --  0.68 0.70 0.67   GFRESTIMATED  --   --  85 82 89   GFRESTBLACK  --   --  >90   GFR Calc   >90 102   POTASSIUM  --   --  3.9 3.7 4.6      BP Readings from Last 3 Encounters:   02/01/19 145/77   01/18/19 162/78   08/14/18 147/63    Wt Readings from Last 3 Encounters:   02/01/19 53.1 kg (117 lb)   01/18/19 52.2 kg (115 lb)   08/01/18 52.8 kg (116 lb 6.4 oz)                    Reviewed and updated as needed this visit by clinical staff  Tobacco  Allergies  Meds  Problems  Med Hx  Surg Hx  Fam Hx  Soc Hx        Reviewed and updated as needed this visit by Provider  Tobacco  Allergies  Meds  Problems  Med Hx  Surg Hx  Fam Hx         ROS:  Constitutional, HEENT, cardiovascular, pulmonary, gi and gu systems are negative, except as otherwise noted.    OBJECTIVE:     /77   Pulse 71   Temp 97.9  F (36.6  C) (Oral)   Resp 14   Ht 1.619 m (5' 3.75\")   Wt 53.1 kg (117 lb)   SpO2 98%   BMI 20.24 kg/m    Body mass index is 20.24 kg/m .  GENERAL APPEARANCE: healthy, alert and no distress     EYES: R pupil enlarged s/p surgery, EOMI, sclera clear     HENT: nose and mouth without ulcers or lesions     NECK: no adenopathy, no asymmetry, masses, or scars and thyroid normal to " palpation     RESP: lungs clear to auscultation - no rales, rhonchi or wheezes     CV: regular rates and rhythm, normal S1 S2, no S3 or S4 and no murmur, click or rub      Abdomen: soft, nontender, no HSM or masses and bowel sounds normal     Ext: warm, dry, no edema      Psych: full range affect, normal speech and grooming, judgement and insight intact     Diagnostic Test Results:  Results for orders placed or performed in visit on 01/18/19   Lipid panel reflex to direct LDL Fasting   Result Value Ref Range    Cholesterol 272 (H) <200 mg/dL    Triglycerides 94 <150 mg/dL    HDL Cholesterol 79 >49 mg/dL    LDL Cholesterol Calculated 174 (H) <100 mg/dL    Non HDL Cholesterol 193 (H) <130 mg/dL   Glucose   Result Value Ref Range    Glucose 93 70 - 99 mg/dL     The 10-year ASCVD risk score (Brenda DOCKERY Jr., et al., 2013) is: 22.9%    Values used to calculate the score:      Age: 78 years      Sex: Female      Is Non- : No      Diabetic: No      Tobacco smoker: No      Systolic Blood Pressure: 145 mmHg      Is BP treated: No      HDL Cholesterol: 79 mg/dL      Total Cholesterol: 272 mg/dL     ASSESSMENT/PLAN:       ICD-10-CM    1. Hyperlipidemia LDL goal <160 E78.5    2. Elevated BP without diagnosis of hypertension R03.0      Lipids- 2/19- reviewed numbers again... ~23% 10-yr ASCVD risk, down to 13% risk if on optimal care (HTN and lipid meds).  Pt wary of medications, tends to have se's.  After discussion of risks/benefits/se's, she would like to continue to hold off on medication for lipids.  Discussed minor adjustments in her diet- already quite good.  Will work on increasing exercise- has been low s/p eye surgery.    BP elevation- Will hold off on meds, but monitor closely - see notes below.    Patient Instructions   Check blood pressures on home arm cuff monitor, and call with results.  Should be seen if level >140/90 to discuss possible medications.    Okay to not start cholesterol medications  at his point, but recheck at next physical.    Hopefully will be better with more exercise after eye recovery.    Diet quite good, but will look at Mediterranian diet and see if there any tweaks they can make.  Maybe try and do more fish, less hamburger.    Return in about 6 months (around 8/1/2019).  Sooner if BP's >140/90    Ekta Solo MD  [facility]

## 2019-02-01 NOTE — PATIENT INSTRUCTIONS
Check blood pressures on home arm cuff monitor, and call with results.  Should be seen if level >140/90 to discuss possible medications.    Okay to not start cholesterol medications at his point, but recheck at next physical.    Hopefully will be better with more exercise after eye recovery.

## 2019-05-24 ENCOUNTER — TELEPHONE (OUTPATIENT)
Dept: FAMILY MEDICINE | Facility: CLINIC | Age: 79
End: 2019-05-24

## 2019-05-24 NOTE — TELEPHONE ENCOUNTER
Prior Authorization Retail Medication Request    Medication/Dose: estradiol (VIVELLE-DOT) 0.075 MG/24HR BIW patch  ICD code (if different than what is on RX):    Previously Tried and Failed:    Rationale: Pt started using estradiol patches in her 40's due to decreased estrogen after hysterectomy.     Insurance Name:  [employer] [phone]  Insurance ID:  [id number]      Pharmacy Information (if different than what is on RX)  Name:  [facility] [address]  Phone:  [phone]

## 2019-05-29 NOTE — TELEPHONE ENCOUNTER
Central Prior Authorization Team   Phone: [phone]    PA Initiation    Medication: estradiol (VIVELLE-DOT) 0.075 MG/24HR BIW patch  Insurance Company: [employer] - Phone [phone] Fax [phone]  Pharmacy Filling the Rx: [facility] - [address]  Filling Pharmacy Phone: [phone]  Filling Pharmacy Fax: [phone]  Start Date: 5/29/2019

## 2019-05-30 NOTE — TELEPHONE ENCOUNTER
Prior Authorization Approval    Authorization Effective Date: 12/30/2018  Authorization Expiration Date: 12/31/2019  Medication: estradiol (VIVELLE-DOT) 0.075 MG/24HR BIW patch-APPROVED  Approved Dose/Quantity:    Reference #:     Insurance Company: [employer] - Phone [phone] Fax [phone]  Expected CoPay:       CoPay Card Available:      Foundation Assistance Needed:    Which Pharmacy is filling the prescription (Not needed for infusion/clinic administered): [facility] - [address]  Pharmacy Notified: Yes  Patient Notified: Yes

## 2019-10-02 ENCOUNTER — HEALTH MAINTENANCE LETTER (OUTPATIENT)
Age: 79
End: 2019-10-02

## 2020-01-24 ENCOUNTER — OFFICE VISIT (OUTPATIENT)
Dept: FAMILY MEDICINE | Facility: CLINIC | Age: 80
End: 2020-01-24
Payer: MEDICARE

## 2020-01-24 VITALS
WEIGHT: 119.06 LBS | HEART RATE: 77 BPM | HEIGHT: 64 IN | BODY MASS INDEX: 20.32 KG/M2 | DIASTOLIC BLOOD PRESSURE: 71 MMHG | SYSTOLIC BLOOD PRESSURE: 136 MMHG | OXYGEN SATURATION: 99 % | TEMPERATURE: 98.1 F | RESPIRATION RATE: 14 BRPM

## 2020-01-24 DIAGNOSIS — E55.9 VITAMIN D DEFICIENCY: ICD-10-CM

## 2020-01-24 DIAGNOSIS — N95.1 MENOPAUSAL SYNDROME (HOT FLASHES): ICD-10-CM

## 2020-01-24 DIAGNOSIS — F34.1 DYSTHYMIA: ICD-10-CM

## 2020-01-24 DIAGNOSIS — Z00.00 ENCOUNTER FOR ROUTINE ADULT HEALTH EXAMINATION WITHOUT ABNORMAL FINDINGS: Primary | ICD-10-CM

## 2020-01-24 DIAGNOSIS — Z00.00 ENCOUNTER FOR MEDICARE ANNUAL WELLNESS EXAM: ICD-10-CM

## 2020-01-24 DIAGNOSIS — M85.89 OSTEOPENIA OF MULTIPLE SITES: ICD-10-CM

## 2020-01-24 DIAGNOSIS — E78.5 HYPERLIPIDEMIA LDL GOAL <160: ICD-10-CM

## 2020-01-24 LAB
CHOLEST SERPL-MCNC: 270 MG/DL
HDLC SERPL-MCNC: 74 MG/DL
LDLC SERPL CALC-MCNC: 178 MG/DL
NONHDLC SERPL-MCNC: 196 MG/DL
TRIGL SERPL-MCNC: 89 MG/DL

## 2020-01-24 PROCEDURE — 80061 LIPID PANEL: CPT | Performed by: FAMILY MEDICINE

## 2020-01-24 PROCEDURE — 36415 COLL VENOUS BLD VENIPUNCTURE: CPT | Performed by: FAMILY MEDICINE

## 2020-01-24 PROCEDURE — G0439 PPPS, SUBSEQ VISIT: HCPCS | Performed by: FAMILY MEDICINE

## 2020-01-24 PROCEDURE — 82306 VITAMIN D 25 HYDROXY: CPT | Performed by: FAMILY MEDICINE

## 2020-01-24 RX ORDER — ESTRADIOL 0.07 MG/D
FILM, EXTENDED RELEASE TRANSDERMAL
Qty: 24 PATCH | Refills: 3 | Status: CANCELLED | OUTPATIENT
Start: 2020-01-24

## 2020-01-24 RX ORDER — ESTRADIOL 0.05 MG/D
1 PATCH, EXTENDED RELEASE TRANSDERMAL
Qty: 24 PATCH | Refills: 3 | Status: SHIPPED | OUTPATIENT
Start: 2020-01-27 | End: 2021-02-01

## 2020-01-24 ASSESSMENT — ACTIVITIES OF DAILY LIVING (ADL): CURRENT_FUNCTION: NO ASSISTANCE NEEDED

## 2020-01-24 ASSESSMENT — MIFFLIN-ST. JEOR: SCORE: 1006.09

## 2020-01-24 ASSESSMENT — PAIN SCALES - GENERAL: PAINLEVEL: NO PAIN (0)

## 2020-01-24 ASSESSMENT — PATIENT HEALTH QUESTIONNAIRE - PHQ9: SUM OF ALL RESPONSES TO PHQ QUESTIONS 1-9: 1

## 2020-01-24 NOTE — NURSING NOTE
"Chief Complaint   Patient presents with     Medicare Visit     patient is fasting      /71 (BP Location: Left arm, Patient Position: Sitting, Cuff Size: Adult Regular)   Pulse 77   Temp 98.1  F (36.7  C) (Oral)   Resp 14   Ht 1.619 m (5' 3.75\")   Wt 54 kg (119 lb 1 oz)   LMP  (Exact Date)   SpO2 99%   Breastfeeding No   BMI 20.60 kg/m   Estimated body mass index is 20.6 kg/m  as calculated from the following:    Height as of this encounter: 1.619 m (5' 3.75\").    Weight as of this encounter: 54 kg (119 lb 1 oz).  bp completed using cuff size: regular      Health Maintenance addressed:  NONE    N/a  Drea Hernandez CMA on 1/24/2020 at 10:44 AM                "

## 2020-01-24 NOTE — PATIENT INSTRUCTIONS
Reminders-  --Mychart with message letting us know the date of your shingles shot at [facility] (and flu shot).  --Also let us know the dose of your Vit D (is it 2000 or 4000 units daily)?  --Schedule mammogram and bone density scan.  --Work on less red meat and meat in general in diet.  --Lower dose of vivelle-dot just a bit, but let us know if it is not working well after a few months.  --Supplements  ----Stop the extra Vit C  ----Stop the tylenol PM at night, try melatonin 3-5mg at night instead  ----Stop daily tumeric- can take as needed for joint pains.  ----Consider stopping magnesium, but can restart as needed for insomnia, headaches, constipation, muscle aches/spasms.    PLEASE CALL TO SCHEDULE YOUR MAMMOGRAM  *[facility] [phone]  *call and schedule mammogram and bone density scan  [facility] [phone]  [facility]   [phone]  Central Scheduling (all locations) [phone]    If you are under/uninsured, we recommend you contact the [facility]. They offer mammograms on a sliding fee charge. You can schedule with them at [phone].      Patient Education   Personalized Prevention Plan  You are due for the preventive services outlined below.  Your care team is available to assist you in scheduling these services.  If you have already completed any of these items, please share that information with your care team to update in your medical record.  Health Maintenance Due   Topic Date Due     PHQ-2  01/01/2020     FALL RISK ASSESSMENT  01/18/2020     Annual Wellness Visit  01/18/2020

## 2020-01-24 NOTE — PROGRESS NOTES
"SUBJECTIVE:   Amada Hurd is a 79 year old female who presents for Preventive Visit.  Are you in the first 12 months of your Medicare coverage?  No    Healthy Habits:     In general, how would you rate your overall health?  Excellent    Frequency of exercise:  2-3 days/week    Duration of exercise:  15-30 minutes    Do you usually eat at least 4 servings of fruit and vegetables a day, include whole grains    & fiber and avoid regularly eating high fat or \"junk\" foods?  Yes    Taking medications regularly:  Yes    Barriers to taking medications:  Not applicable    Medication side effects:  None    Ability to successfully perform activities of daily living:  No assistance needed    Home Safety:  No safety concerns identified    Hearing Impairment:  No hearing concerns    In the past 6 months, have you been bothered by leaking of urine?  No    In general, how would you rate your overall mental or emotional health?  Excellent      PHQ-2 Total Score: 0    Additional concerns today:  No    Lipids- high at last visit, statins recommended, but she wanted to focus on diet changes.  Any done?  She doesn't recall...  Doesn't think they've made any changes.    Diet- chicken, salmon, tilapia, once a week will have red meat (steak or pork).  He makes the meat, and she makes the vegetables/salads.  Green beans, broccolli, asparagus, corn.  Beverages- water mostly, red wine in the evening.  Yogurt for breakfast, cheese on salad each evening.    Exercise- yoga and walking on treadmill  Traveling a lot, so less treadmill.      HRT- has been on vivelle dot for many yrs.  She has tried off, but her mood tends to get really bad.  Dose was lowered several yrs ago per pt, and did okay after adjustment period.  No change in dose per our records from '14.  0.075mg dose since then- twice weekly.  Does note she did worse when she tried to do this once weekly.    Vit D 2000 or 4000 units/day.  Last check was 1/18, Vit D at 60.    Second " nelly shot was at [facility], ~2-3 months ago.  Got flu shot at the same time.      Do you feel safe in your environment? Yes    Have you ever done Advance Care Planning? (For example, a Health Directive, POLST, or a discussion with a medical provider or your loved ones about your wishes): Yes, advance care planning is on file.      Fall risk  Fallen 2 or more times in the past year?: No  Any fall with injury in the past year?: No    Cognitive Screening   1) Repeat 3 items (Leader, Season, Table)    2) Clock draw: NORMAL  3) 3 item recall: Recalls 2 objects   Results: NORMAL clock, 1-2 items recalled: COGNITIVE IMPAIRMENT LESS LIKELY    Mini-CogTM Copyright FAISAL Traore. Licensed by the author for use in [facility]; reprinted with permission (mishel@[facility]). All rights reserved.      Do you have sleep apnea, excessive snoring or daytime drowsiness?: no    Reviewed and updated as needed this visit by clinical staff  Tobacco  Allergies  Meds  Problems  Med Hx  Surg Hx  Fam Hx         Reviewed and updated as needed this visit by Provider  Tobacco  Allergies  Meds  Problems  Med Hx  Surg Hx  Fam Hx        Social History     Tobacco Use     Smoking status: Never Smoker     Smokeless tobacco: Never Used   Substance Use Topics     Alcohol use: Yes     Alcohol/week: 0.0 standard drinks     Comment: 1 glass of wine- 2-3 times a week     If you drink alcohol do you typically have >3 drinks per day or >7 drinks per week? No    Alcohol Use 1/24/2020   Prescreen: >3 drinks/day or >7 drinks/week? No   Prescreen: >3 drinks/day or >7 drinks/week? -   AUDIT SCORE  -     AUDIT - Alcohol Use Disorders Identification Test - Reproduced from the World Health Organization Audit 2001 (Second Edition) 1/12/2018   1.  How often do you have a drink containing alcohol? 2 to 4 times a month   2.  How many drinks containing alcohol do you have on a typical day when you are drinking? 1 or 2   3.  How often do  you have five or more drinks on one occasion? Never   4.  How often during the last year have you found that you were not able to stop drinking once you had started? Never   5.  How often during the last year have you failed to do what was normally expected of you because of drinking? Never   6.  How often during the last year have you needed a first drink in the morning to get yourself going after a heavy drinking session? Never   7.  How often during the last year have you had a feeling of guilt or remorse after drinking? Never   8.  How often during the last year have you been unable to remember what happened the night before because of your drinking? Never   9.  Have you or someone else been injured because of your drinking? No   10. Has a relative, friend, doctor or other health care worker been concerned about your drinking or suggested you cut down? No   TOTAL SCORE 2             Current providers sharing in care for this patient include:   Patient Care Team:  Ekta Solo MD as PCP - General (Family Practice)  Ekta Solo MD as Assigned PCP    The following health maintenance items are reviewed in Epic and correct as of today:  Health Maintenance   Topic Date Due     DEPRESSION ACTION PLAN  1940     MEDICARE ANNUAL WELLNESS VISIT  01/18/2020     PHQ-9  07/24/2020     FALL RISK ASSESSMENT  01/24/2021     LIPID  01/24/2025     ADVANCE CARE PLANNING  01/24/2025     DTAP/TDAP/TD IMMUNIZATION (2 - Td) 01/18/2029     DEXA  Completed     INFLUENZA VACCINE  Completed     PNEUMOCOCCAL IMMUNIZATION 65+ LOW/MEDIUM RISK  Completed     ZOSTER IMMUNIZATION  Completed     IPV IMMUNIZATION  Aged Out     MENINGITIS IMMUNIZATION  Aged Out       Lab work is in process  Labs reviewed in EPIC  BP Readings from Last 3 Encounters:   01/24/20 136/71   02/01/19 145/77   01/18/19 162/78    Wt Readings from Last 3 Encounters:   01/24/20 54 kg (119 lb 1 oz)   02/01/19 53.1 kg (117 lb)   01/18/19 52.2 kg  (115 lb)                  Patient Active Problem List   Diagnosis     Hyperlipidemia LDL goal <160     Sciatica     Lumbar disc disease     Fractured coccyx (H)     Advance Care Planning     Menopausal syndrome (hot flashes)     Raynaud disease     Detached retina, right     Past Surgical History:   Procedure Laterality Date     APPENDECTOMY       CYSTOCELE REPAIR       [facility] OR OCULAR DEVICE INTRAOP DETACHED RETINA  02/22/2018    Dr. López Alcala     HYSTERECTOMY      ? part of left ovary in     PHACOEMULSIFICATION CLEAR CORNEA WITH STANDARD INTRAOCULAR LENS IMPLANT Right 8/14/2018    Procedure: PHACOEMULSIFICATION CLEAR CORNEA WITH STANDARD INTRAOCULAR LENS IMPLANT;  RIGHT EYE PHACOEMULSIFICATION CLEAR CORNEA WITH STANDARD INTRAOCULAR LENS IMPLANT;  Surgeon: Justyn Ly MD;  Location:  [facility]     REPAIR BLADDER         Social History     Tobacco Use     Smoking status: Never Smoker     Smokeless tobacco: Never Used   Substance Use Topics     Alcohol use: Yes     Alcohol/week: 0.0 standard drinks     Comment: 1 glass of wine- 2-3 times a week     Family History   Problem Relation Age of Onset     Hypertension Father      Cancer Other      Alzheimer Disease Other          Current Outpatient Medications   Medication Sig Dispense Refill     aspirin (ANACIN) 81 MG EC tablet Take 81 mg by mouth daily       brimonidine (ALPHAGAN) 0.2 % ophthalmic solution Place 1 drop into the right eye 2 times daily        Calcium 250 MG CAPS Take 250 mg by mouth daily        COENZYME Q-10 PO Take 100 mg by mouth       diphenhydrAMINE-acetaminophen (TYLENOL PM EXTRA STRENGTH)  MG tablet Take 1 tablet by mouth At Bedtime Take 1-2 tabs PRN at bedtime       estradiol (VIVELLE-DOT) 0.05 MG/24HR bi-weekly patch Place 1 patch onto the skin twice a week 24 patch 3     magnesium 250 MG tablet Take 1 tablet by mouth daily       Multiple Vitamins-Minerals (PRESERVISION AREDS 2) CAPS        Omega-3 Fatty Acids (FISH OIL OMEGA-3 PO)  "Take 227 mg by mouth Takes 2 daily       UNABLE TO FIND Take 500 mg by mouth daily MEDICATION NAME: Tumeric       UNABLE TO FIND Take 2 tablets by mouth daily MEDICATION NAME: Mobility helper supplement       UNABLE TO FIND Take 2 capsules by mouth daily MEDICATION NAME: Vitalzem supplement       VITAMIN D, CHOLECALCIFEROL, PO Take 1,000 Units by mouth daily Taking 2 caps once daily (2,000 mg)       acetaminophen (TYLENOL) 325 MG tablet Take 325-650 mg by mouth as needed       atropine 1 % ophthalmic solution Place 1 drop into the right eye 2 times daily        hydrOXYzine (VISTARIL) 25 MG capsule Take 1 capsule (25 mg) by mouth 3 times daily as needed for anxiety (take one in morning, and then 1-2 times more during day as needed for panic) (Patient not taking: Reported on 1/24/2020) 90 capsule 1     Multiple Vitamin (MULTI-VITAMINS) TABS Take 2 tablets by mouth daily       Allergies   Allergen Reactions     Codeine Nausea and Vomiting     Recent Labs   Lab Test 01/24/20  1141 01/18/19  1246 01/12/18  1233 12/03/15  0942 10/16/14  1042 09/16/13   * 174* 156* 154* 154* 132   HDL 74 79 85 73 78 75   TRIG 89 94 106 97 60 82   ALT  --   --   --   --   --  15   CR  --   --   --  0.68 0.70 0.67   GFRESTIMATED  --   --   --  85 82 89   GFRESTBLACK  --   --   --  >90   GFR Calc   >90 102   POTASSIUM  --   --   --  3.9 3.7 4.6          Review of Systems  Constitutional, HEENT, cardiovascular, pulmonary, gi and gu systems are negative, except as otherwise noted.    OBJECTIVE:   /71 (BP Location: Left arm, Patient Position: Sitting, Cuff Size: Adult Regular)   Pulse 77   Temp 98.1  F (36.7  C) (Oral)   Resp 14   Ht 1.619 m (5' 3.75\")   Wt 54 kg (119 lb 1 oz)   LMP  (Exact Date)   SpO2 99%   Breastfeeding No   BMI 20.60 kg/m   Estimated body mass index is 20.6 kg/m  as calculated from the following:    Height as of this encounter: 1.619 m (5' 3.75\").    Weight as of this encounter: 54 kg " (119 lb 1 oz).  Physical Exam  GENERAL APPEARANCE: healthy, alert and no distress  EYES: Eyes grossly normal to inspection, PERRL and conjunctivae and sclerae normal  HENT: ear canals and TM's normal, nose and mouth without ulcers or lesions, oropharynx clear and oral mucous membranes moist  NECK: no adenopathy, no asymmetry, masses, or scars and thyroid normal to palpation  RESP: lungs clear to auscultation - no rales, rhonchi or wheezes  BREAST: normal without masses, tenderness or nipple discharge and no palpable axillary masses or adenopathy  CV: regular rate and rhythm, normal S1 S2, no S3 or S4, no murmur, click or rub, no peripheral edema and peripheral pulses strong  ABDOMEN: soft, nontender, no hepatosplenomegaly, no masses and bowel sounds normal  MS: no musculoskeletal defects are noted and gait is age appropriate without ataxia  SKIN: no suspicious lesions or rashes  NEURO: Normal strength and tone, sensory exam grossly normal, mentation intact and speech normal  PSYCH: mentation appears normal and affect normal/bright        ASSESSMENT / PLAN:       ICD-10-CM    1. Encounter for routine adult health examination without abnormal findings Z00.00    2. Menopausal syndrome (hot flashes) N95.1 estradiol (VIVELLE-DOT) 0.05 MG/24HR bi-weekly patch   3. Hyperlipidemia LDL goal <160 E78.5 Lipid panel reflex to direct LDL Fasting   4. Dysthymia F34.1 estradiol (VIVELLE-DOT) 0.05 MG/24HR bi-weekly patch     HC BEHAV ASSMT W/SCORE & DOCD/STAND INSTRUMENT   5. Vitamin D deficiency E55.9 Vitamin D Deficiency   6. Osteopenia of multiple sites M85.89 DX Hip/Pelvis/Spine   7. Encounter for Medicare annual wellness exam Z00.00      CPE- Discussed diet, calcium and exercise.  Thin prep pap was not done.  Eye and dental care UTD or recommended f/u.  No immunizations needed today.  See orders below for tests ordered and screening needed.  See pt instructions as well.  Dexa scan ordered, and she can get mammogram at the same  "time.  Discussed supplements, and rec stopping or using some prn to lower pill burden (details in pt instructions).    HRT/dysthymia- pt has been on vivelle-dot for yrs, and finds that her mood really worsens when she's gone off or tried to extend the patch to a week instead of twice weekly.  Discussed we should try and lower the dose a bit, as it's been several yrs since that has been tried.  Will send in rx for 0.05 dose (down from 0.075 dose) twice weekly.  Risks and benefits of medication(s) including potential side effects reviewed with patient.  Questions answered.     Lipids- Will recheck fasting lipids today, but last year, risk was high, and she didn't want to do statins (now or then).  Forgot to try and work on diet, though.  Discussed making a meal-less meal each week, increasing portion of vegetables, and having less red meat.    COUNSELING:  Reviewed preventive health counseling, as reflected in patient instructions    Estimated body mass index is 20.6 kg/m  as calculated from the following:    Height as of this encounter: 1.619 m (5' 3.75\").    Weight as of this encounter: 54 kg (119 lb 1 oz).    Weight management plan noted, stable and monitoring     reports that she has never smoked. She has never used smokeless tobacco.      Appropriate preventive services were discussed with this patient, including applicable screening as appropriate for cardiovascular disease, diabetes, osteopenia/osteoporosis, and glaucoma.  As appropriate for age/gender, discussed screening for colorectal cancer, prostate cancer, breast cancer, and cervical cancer. Checklist reviewing preventive services available has been given to the patient.    Reviewed patients plan of care and provided an AVS. The Basic Care Plan (routine screening as documented in Health Maintenance) for Amada meets the Care Plan requirement. This Care Plan has been established and reviewed with the Patient.    Counseling Resources:  ATP IV " Guidelines  Pooled Cohorts Equation Calculator  Breast Cancer Risk Calculator  FRAX Risk Assessment  ICSI Preventive Guidelines  Dietary Guidelines for Americans, 2010  [employer]'s MyPlate  ASA Prophylaxis  Lung CA Screening    Ekta Solo MD  [facility]    Identified Health Risks:

## 2020-01-26 LAB — DEPRECATED CALCIDIOL+CALCIFEROL SERPL-MC: 63 UG/L (ref 20–75)

## 2020-01-26 NOTE — RESULT ENCOUNTER NOTE
Here are your lab results from your recent visit...  -Your Vitamin D levels are on the higher end of normal.  I would make sure you are taking just 2000 units/day of the Vitamin D at home (so just take one tablet if they are 2000 unit tabs).  -Your cholesterol panel looks abnormal with a high LDL (the bad cholesterol), though your HDL (the good cholesterol) is also high which is good.  Your cardiovascular risk is still considered high with the high LDL and your age as we discussed, so if you are trying to stay away from the statin medications, I would work on getting more vegetables and less red meat in your diet.    Please let me know if you have any questions.  Best,   Mark Solo MD

## 2020-02-03 ENCOUNTER — HOSPITAL ENCOUNTER (OUTPATIENT)
Dept: BONE DENSITY | Facility: CLINIC | Age: 80
Discharge: HOME | End: 2020-02-03
Attending: FAMILY MEDICINE | Admitting: FAMILY MEDICINE
Payer: MEDICARE

## 2020-02-03 DIAGNOSIS — M85.89 OSTEOPENIA OF MULTIPLE SITES: ICD-10-CM

## 2020-02-03 PROCEDURE — 77085 DXA BONE DENSITY AXL VRT FX: CPT

## 2020-02-10 NOTE — RESULT ENCOUNTER NOTE
Here are your lab results from your recent visit...  -We have received the results of your DEXA bone densitometry, and it shows mild bone density loss (mild osteopenia - which can be a precursor to osteoporosis), with some worsening since the last DEXA potter in 11/16.  Your risk of having a hip fracture at 4% in the next ten years also put you in the range where we should talk about the option of going on prescription medication for your bone density (like fosamax or similar medications).      Please make an appointment if you would like to talk about and consider going on one of these medications (we may also get further labs).   We could also discuss your cholesterol labs further at an appointment.    In the meantime, I would continue getting enough calcium and Vitamin D with a combination of your diet and supplements, and continue to try and get in some weight bearing exercise.     Mark Persaud MD

## 2020-03-22 ENCOUNTER — HEALTH MAINTENANCE LETTER (OUTPATIENT)
Age: 80
End: 2020-03-22

## 2021-01-15 ENCOUNTER — HEALTH MAINTENANCE LETTER (OUTPATIENT)
Age: 81
End: 2021-01-15

## 2021-02-06 ASSESSMENT — ACTIVITIES OF DAILY LIVING (ADL): CURRENT_FUNCTION: NO ASSISTANCE NEEDED

## 2021-02-09 ENCOUNTER — OFFICE VISIT (OUTPATIENT)
Dept: FAMILY MEDICINE | Facility: CLINIC | Age: 81
End: 2021-02-09
Payer: MEDICARE

## 2021-02-09 VITALS
SYSTOLIC BLOOD PRESSURE: 145 MMHG | TEMPERATURE: 98.5 F | RESPIRATION RATE: 16 BRPM | WEIGHT: 121.7 LBS | HEIGHT: 64 IN | HEART RATE: 73 BPM | BODY MASS INDEX: 20.78 KG/M2 | DIASTOLIC BLOOD PRESSURE: 64 MMHG | OXYGEN SATURATION: 99 %

## 2021-02-09 DIAGNOSIS — Z00.00 ENCOUNTER FOR MEDICARE ANNUAL WELLNESS EXAM: Primary | ICD-10-CM

## 2021-02-09 DIAGNOSIS — F34.1 DYSTHYMIA: ICD-10-CM

## 2021-02-09 DIAGNOSIS — N95.1 MENOPAUSAL SYNDROME (HOT FLASHES): ICD-10-CM

## 2021-02-09 PROCEDURE — G0439 PPPS, SUBSEQ VISIT: HCPCS | Performed by: FAMILY MEDICINE

## 2021-02-09 RX ORDER — ESTRADIOL 0.05 MG/D
1 PATCH, EXTENDED RELEASE TRANSDERMAL
Qty: 32 PATCH | Refills: 3 | Status: SHIPPED | OUTPATIENT
Start: 2021-02-11 | End: 2022-07-25

## 2021-02-09 ASSESSMENT — MIFFLIN-ST. JEOR: SCORE: 1013.06

## 2021-02-09 ASSESSMENT — ACTIVITIES OF DAILY LIVING (ADL): CURRENT_FUNCTION: NO ASSISTANCE NEEDED

## 2021-02-09 NOTE — NURSING NOTE
"Chief Complaint   Patient presents with     Medicare Visit     BP (!) 145/64   Pulse 73   Temp 98.5  F (36.9  C) (Tympanic)   Resp 16   Ht 1.619 m (5' 3.75\")   Wt 55.2 kg (121 lb 11.2 oz)   SpO2 99%   BMI 21.05 kg/m   Estimated body mass index is 21.05 kg/m  as calculated from the following:    Height as of this encounter: 1.619 m (5' 3.75\").    Weight as of this encounter: 55.2 kg (121 lb 11.2 oz).  Medication Reconciliation: complete        Health Maintenance Due Pending Provider Review:  NONE    n/a    Eusebia Duncan MA  [facility]  [phone]  "

## 2021-02-09 NOTE — PROGRESS NOTES
"SUBJECTIVE:   Amada Hurd is a 80 year old female who presents for Preventive Visit.    Patient has been advised of split billing requirements and indicates understanding: Yes     Are you in the first 12 months of your Medicare coverage?  No    Healthy Habits:     In general, how would you rate your overall health?  Excellent    Frequency of exercise:  2-3 days/week    Duration of exercise:  Less than 15 minutes    Do you usually eat at least 4 servings of fruit and vegetables a day, include whole grains    & fiber and avoid regularly eating high fat or \"junk\" foods?  Yes    Taking medications regularly:  Yes    Medication side effects:  None    Ability to successfully perform activities of daily living:  No assistance needed    Home Safety:  No safety concerns identified    Hearing Impairment:  No hearing concerns    In the past 6 months, have you been bothered by leaking of urine?  No    In general, how would you rate your overall mental or emotional health?  Excellent      PHQ-2 Total Score: 0    HRT- has tried going from 2x/wk to 1x/wk, but sx's start returning- crabby, low mood.  THinks it helps her frame of mind and her body.    Retinal detachment- doing better, off the drops.  Not perfect, but better.  Ophtho reviewed, and said she could drive.          Do you feel safe in your environment? Yes    Have you ever done Advance Care Planning? (For example, a Health Directive, POLST, or a discussion with a medical provider or your loved ones about your wishes): Yes, advance care planning is on file.       Fall risk  Fallen 2 or more times in the past year?: No  Any fall with injury in the past year?: No    Cognitive Screening   1) Repeat 3 items (Leader, Season, Table)    2) Clock draw: NORMAL  3) 3 item recall: Recalls 2 objects   Results: NORMAL clock, 1-2 items recalled: COGNITIVE IMPAIRMENT LESS LIKELY    Mini-CogTM Copyright S [employer]. Licensed by the author for use in [facility]; reprinted " with permission (mishel@[facility]). All rights reserved.      Do you have sleep apnea, excessive snoring or daytime drowsiness?: no    Reviewed and updated as needed this visit by clinical staff  Tobacco  Allergies  Meds  Problems  Med Hx  Surg Hx  Fam Hx          Reviewed and updated as needed this visit by Provider  Tobacco  Allergies  Meds  Problems  Med Hx  Surg Hx  Fam Hx         Social History     Tobacco Use     Smoking status: Never Smoker     Smokeless tobacco: Never Used   Substance Use Topics     Alcohol use: Yes     Alcohol/week: 0.0 standard drinks     Comment: 1 serving of  wine per day     If you drink alcohol do you typically have >3 drinks per day or >7 drinks per week? No      AUDIT - Alcohol Use Disorders Identification Test - Reproduced from the World Health Organization Audit 2001 (Second Edition) 1/12/2018   1.  How often do you have a drink containing alcohol? 2 to 4 times a month   2.  How many drinks containing alcohol do you have on a typical day when you are drinking? 1 or 2   3.  How often do you have five or more drinks on one occasion? Never   4.  How often during the last year have you found that you were not able to stop drinking once you had started? Never   5.  How often during the last year have you failed to do what was normally expected of you because of drinking? Never   6.  How often during the last year have you needed a first drink in the morning to get yourself going after a heavy drinking session? Never   7.  How often during the last year have you had a feeling of guilt or remorse after drinking? Never   8.  How often during the last year have you been unable to remember what happened the night before because of your drinking? Never   9.  Have you or someone else been injured because of your drinking? No   10. Has a relative, friend, doctor or other health care worker been concerned about your drinking or suggested you cut down? No   TOTAL SCORE 2       Current  providers sharing in care for this patient include:   Patient Care Team:  Ekta Solo MD as PCP - General (Family Practice)  Ekta Solo MD as Assigned PCP    The following health maintenance items are reviewed in Epic and correct as of today:  Health Maintenance   Topic Date Due     DEPRESSION ACTION PLAN  1940     COVID-19 Vaccine (1 of 2) 03/23/1958     HEPATITIS C SCREENING  03/23/1960     PHQ-9  07/24/2020     FALL RISK ASSESSMENT  01/24/2021     MEDICARE ANNUAL WELLNESS VISIT  02/09/2022     LIPID  01/24/2025     ADVANCE CARE PLANNING  01/27/2025     DTAP/TDAP/TD IMMUNIZATION (2 - Td) 01/18/2029     DEXA  02/03/2035     INFLUENZA VACCINE  Completed     Pneumococcal Vaccine: 65+ Years  Completed     ZOSTER IMMUNIZATION  Completed     Pneumococcal Vaccine: Pediatrics (0 to 5 Years) and At-Risk Patients (6 to 64 Years)  Aged Out     IPV IMMUNIZATION  Aged Out     MENINGITIS IMMUNIZATION  Aged Out     HEPATITIS B IMMUNIZATION  Aged Out       Lab work is in process  Labs reviewed in EPIC  BP Readings from Last 3 Encounters:   02/09/21 (!) 145/64   01/24/20 136/71   02/01/19 145/77    Wt Readings from Last 3 Encounters:   02/09/21 55.2 kg (121 lb 11.2 oz)   01/24/20 54 kg (119 lb 1 oz)   02/01/19 53.1 kg (117 lb)                  Patient Active Problem List   Diagnosis     Hyperlipidemia LDL goal <160     Sciatica     Lumbar disc disease     Fractured coccyx (H)     Advance Care Planning     Menopausal syndrome (hot flashes)     Raynaud disease     Detached retina, right     Past Surgical History:   Procedure Laterality Date     ABDOMEN SURGERY  1980    Hystrdicomy     APPENDECTOMY       CYSTOCELE REPAIR       [facility] OR OCULAR DEVICE INTRAOP DETACHED RETINA  02/22/2018    Dr. López Alcala     HYSTERECTOMY      ? part of left ovary in     PHACOEMULSIFICATION CLEAR CORNEA WITH STANDARD INTRAOCULAR LENS IMPLANT Right 8/14/2018    Procedure: PHACOEMULSIFICATION CLEAR CORNEA WITH  STANDARD INTRAOCULAR LENS IMPLANT;  RIGHT EYE PHACOEMULSIFICATION CLEAR CORNEA WITH STANDARD INTRAOCULAR LENS IMPLANT;  Surgeon: Justyn Ly MD;  Location:  [facility]     REPAIR BLADDER         Social History     Tobacco Use     Smoking status: Never Smoker     Smokeless tobacco: Never Used   Substance Use Topics     Alcohol use: Yes     Alcohol/week: 0.0 standard drinks     Comment: 1 serving of  wine per day     Family History   Problem Relation Age of Onset     Hypertension Father      Cancer Other      Alzheimer Disease Other          Current Outpatient Medications   Medication Sig Dispense Refill     acetaminophen (TYLENOL) 325 MG tablet Take 325-650 mg by mouth as needed       aspirin (ANACIN) 81 MG [facility] tablet Take 81 mg by mouth daily       Calcium 250 MG CAPS Take 250 mg by mouth daily        COENZYME Q-10 PO Take 100 mg by mouth       diphenhydrAMINE-acetaminophen (TYLENOL PM EXTRA STRENGTH)  MG tablet Take 1 tablet by mouth At Bedtime Take 1-2 tabs PRN at bedtime       [START ON 2/11/2021] estradiol (VIVELLE-DOT) 0.05 MG/24HR bi-weekly patch Place 1 patch onto the skin twice a week 32 patch 3     hydrOXYzine (VISTARIL) 25 MG capsule Take 1 capsule (25 mg) by mouth 3 times daily as needed for anxiety (take one in morning, and then 1-2 times more during day as needed for panic) 90 capsule 1     magnesium 250 MG tablet Take 1 tablet by mouth daily       Multiple Vitamin (MULTI-VITAMINS) TABS Take 2 tablets by mouth daily       Multiple Vitamins-Minerals (PRESERVISION AREDS 2) CAPS        Omega-3 Fatty Acids (FISH OIL OMEGA-3 PO) Take 227 mg by mouth Takes 2 daily       UNABLE TO FIND Take 500 mg by mouth daily MEDICATION NAME: Tumeric       UNABLE TO FIND Take 2 tablets by mouth daily MEDICATION NAME: Mobility helper supplement       UNABLE TO FIND Take 2 capsules by mouth daily MEDICATION NAME: Vitalzem supplement       VITAMIN D, CHOLECALCIFEROL, PO Take 1,000 Units by mouth daily Taking 2 caps once  "daily (2,000 mg)       Allergies   Allergen Reactions     Codeine Nausea and Vomiting     Recent Labs   Lab Test 01/24/20  1141 01/18/19  1246 01/12/18  1233 12/03/15  0942 10/16/14  1042 09/16/13   * 174* 156* 154* 154* 132   HDL 74 79 85 73 78 75   TRIG 89 94 106 97 60 82   ALT  --   --   --   --   --  15   CR  --   --   --  0.68 0.70 0.67   GFRESTIMATED  --   --   --  85 82 89   GFRESTBLACK  --   --   --  >90   GFR Calc   >90 102   POTASSIUM  --   --   --  3.9 3.7 4.6      Mammogram Screening - Patient over age 75, has elected to continue with screening.    Review of Systems  Constitutional, HEENT, cardiovascular, pulmonary, gi and gu systems are negative, except as otherwise noted.    OBJECTIVE:   BP (!) 145/64   Pulse 73   Temp 98.5  F (36.9  C) (Tympanic)   Resp 16   Ht 1.619 m (5' 3.75\")   Wt 55.2 kg (121 lb 11.2 oz)   SpO2 99%   BMI 21.05 kg/m   Estimated body mass index is 21.05 kg/m  as calculated from the following:    Height as of this encounter: 1.619 m (5' 3.75\").    Weight as of this encounter: 55.2 kg (121 lb 11.2 oz).  Physical Exam  GENERAL: healthy, alert and no distress  EYES: Eyes grossly normal to inspection, PERRL and conjunctivae and sclerae normal  HENT: ear canals and TM's normal, nose and mouth without ulcers or lesions  NECK: no adenopathy, no asymmetry, masses, or scars and thyroid normal to palpation  RESP: lungs clear to auscultation - no rales, rhonchi or wheezes  BREAST: normal without masses, tenderness or nipple discharge and no palpable axillary masses or adenopathy  CV: regular rate and rhythm, normal S1 S2, no S3 or S4, no murmur, click or rub, no peripheral edema and peripheral pulses strong  ABDOMEN: soft, nontender, no hepatosplenomegaly, no masses and bowel sounds normal  MS: no gross musculoskeletal defects noted, no edema  SKIN: no suspicious lesions or rashes  NEURO: Normal strength and tone, mentation intact and speech normal  PSYCH: " "mentation appears normal, affect normal/bright    Diagnostic Test Results:  Labs reviewed in Epic      ASSESSMENT / PLAN:       ICD-10-CM    1. Encounter for Medicare annual wellness exam  Z00.00    2. Menopausal syndrome (hot flashes)  N95.1 estradiol (VIVELLE-DOT) 0.05 MG/24HR bi-weekly patch   3. Dysthymia  F34.1 estradiol (VIVELLE-DOT) 0.05 MG/24HR bi-weekly patch       Patient has been advised of split billing requirements and indicates understanding: Yes  COUNSELING:  Reviewed preventive health counseling, as reflected in patient instructions       Regular exercise       Healthy diet/nutrition       Fall risk prevention       Osteoporosis prevention/bone health    Estimated body mass index is 21.05 kg/m  as calculated from the following:    Height as of this encounter: 1.619 m (5' 3.75\").    Weight as of this encounter: 55.2 kg (121 lb 11.2 oz).    Weight management plan noted, stable and monitoring    She reports that she has never smoked. She has never used smokeless tobacco.      Appropriate preventive services were discussed with this patient, including applicable screening as appropriate for cardiovascular disease, diabetes, osteopenia/osteoporosis, and glaucoma.  As appropriate for age/gender, discussed screening for colorectal cancer, prostate cancer, breast cancer, and cervical cancer. Checklist reviewing preventive services available has been given to the patient.    Reviewed patients plan of care and provided an AVS. The Basic Care Plan (routine screening as documented in Health Maintenance) for Amada meets the Care Plan requirement. This Care Plan has been established and reviewed with the Patient.    Counseling Resources:  ATP IV Guidelines  Pooled Cohorts Equation Calculator  Breast Cancer Risk Calculator  Breast Cancer: Medication to Reduce Risk  FRAX Risk Assessment  ICSI Preventive Guidelines  Dietary Guidelines for Americans, 2010  USDA's MyPlate  ASA Prophylaxis  Lung CA Screening    Ekta " Betty Solo MD  [facility]    Identified Health Risks:

## 2021-02-09 NOTE — PATIENT INSTRUCTIONS
Patient Education   Personalized Prevention Plan  You are due for the preventive services outlined below.  Your care team is available to assist you in scheduling these services.  If you have already completed any of these items, please share that information with your care team to update in your medical record.  Health Maintenance Due   Topic Date Due     Depression Action Plan  1940     COVID-19 Vaccine (1 of 2) 03/23/1958     Hepatitis C Screening  03/23/1960     Depression Assessment  07/24/2020     FALL RISK ASSESSMENT  01/24/2021     Annual Wellness Visit  01/24/2021

## 2021-09-05 ENCOUNTER — HEALTH MAINTENANCE LETTER (OUTPATIENT)
Age: 81
End: 2021-09-05

## 2022-04-17 ENCOUNTER — HEALTH MAINTENANCE LETTER (OUTPATIENT)
Age: 82
End: 2022-04-17

## 2022-07-25 ENCOUNTER — TELEPHONE (OUTPATIENT)
Dept: FAMILY MEDICINE | Facility: CLINIC | Age: 82
End: 2022-07-25

## 2022-07-25 DIAGNOSIS — F34.1 DYSTHYMIA: ICD-10-CM

## 2022-07-25 DIAGNOSIS — N95.1 MENOPAUSAL SYNDROME (HOT FLASHES): ICD-10-CM

## 2022-07-25 RX ORDER — ESTRADIOL 0.05 MG/D
1 PATCH, EXTENDED RELEASE TRANSDERMAL
Qty: 16 PATCH | Refills: 0 | Status: SHIPPED | OUTPATIENT
Start: 2022-07-25 | End: 2024-03-27 | Stop reason: DRUGHIGH

## 2022-07-25 NOTE — TELEPHONE ENCOUNTER
TC,    Can some please call patient and schedule her for her physical.  Last see 2/11/21.  Note: check if patient has new PCP    Route back to triage after scheduled so refill can be addressed.    Thank you,  Michelle Draper RN

## 2022-07-25 NOTE — TELEPHONE ENCOUNTER
Reason for Call:  Medication or medication refill:    Do you use a [facility]?  Name of the pharmacy and phone number for the current request:  [facility] ([facility]) - [place], [place] - [address]    Name of the medication requested: estradiol (VIVELLE-DOT) 0.05 MG/24HR bi-weekly patch    Other request: Pt called and is requesting a refill    Can we leave a detailed message on this number? YES    Phone number patient can be reached at: Home number on file [phone] (home)    Best Time: anyitme    Call taken on 7/25/2022 at 11:05 AM by Michelle Garcia

## 2022-07-25 NOTE — TELEPHONE ENCOUNTER
Medication is being filled for 1 time refill only due to:  Patient needs to be seen because it has been more than one year since last visit.     Michelle Draper RN

## 2022-07-25 NOTE — TELEPHONE ENCOUNTER
Px scheduled 8/19/22  transfered to Triage to address Refill  [facility] Coordinator

## 2022-08-19 ENCOUNTER — OFFICE VISIT (OUTPATIENT)
Dept: FAMILY MEDICINE | Facility: CLINIC | Age: 82
End: 2022-08-19
Payer: COMMERCIAL

## 2022-08-19 VITALS
RESPIRATION RATE: 16 BRPM | OXYGEN SATURATION: 99 % | DIASTOLIC BLOOD PRESSURE: 64 MMHG | HEIGHT: 64 IN | WEIGHT: 115.3 LBS | BODY MASS INDEX: 19.68 KG/M2 | SYSTOLIC BLOOD PRESSURE: 141 MMHG | TEMPERATURE: 97.5 F | HEART RATE: 72 BPM

## 2022-08-19 DIAGNOSIS — N95.1 MENOPAUSAL SYNDROME (HOT FLASHES): ICD-10-CM

## 2022-08-19 DIAGNOSIS — M85.80 OSTEOPENIA, UNSPECIFIED LOCATION: ICD-10-CM

## 2022-08-19 DIAGNOSIS — H57.89 REDNESS OF LEFT EYE: ICD-10-CM

## 2022-08-19 DIAGNOSIS — Z78.0 ASYMPTOMATIC MENOPAUSAL STATE: ICD-10-CM

## 2022-08-19 DIAGNOSIS — Z00.00 ENCOUNTER FOR MEDICARE ANNUAL WELLNESS EXAM: Primary | ICD-10-CM

## 2022-08-19 DIAGNOSIS — F34.1 DYSTHYMIA: ICD-10-CM

## 2022-08-19 DIAGNOSIS — E78.5 HYPERLIPIDEMIA LDL GOAL <160: ICD-10-CM

## 2022-08-19 PROCEDURE — G0439 PPPS, SUBSEQ VISIT: HCPCS | Performed by: FAMILY MEDICINE

## 2022-08-19 PROCEDURE — 99214 OFFICE O/P EST MOD 30 MIN: CPT | Mod: 25 | Performed by: FAMILY MEDICINE

## 2022-08-19 RX ORDER — ESTRADIOL 0.03 MG/D
1 FILM, EXTENDED RELEASE TRANSDERMAL
Qty: 24 PATCH | Refills: 3 | Status: SHIPPED | OUTPATIENT
Start: 2022-08-22 | End: 2022-08-30

## 2022-08-19 ASSESSMENT — ENCOUNTER SYMPTOMS: EYE PAIN: 0

## 2022-08-19 ASSESSMENT — PATIENT HEALTH QUESTIONNAIRE - PHQ9
SUM OF ALL RESPONSES TO PHQ QUESTIONS 1-9: 0
SUM OF ALL RESPONSES TO PHQ QUESTIONS 1-9: 0

## 2022-08-19 ASSESSMENT — PAIN SCALES - GENERAL: PAINLEVEL: NO PAIN (0)

## 2022-08-19 ASSESSMENT — ACTIVITIES OF DAILY LIVING (ADL): CURRENT_FUNCTION: NO ASSISTANCE NEEDED

## 2022-08-19 NOTE — PROGRESS NOTES
"SUBJECTIVE:   Amada Hurd is a 82 year old female who presents for Preventive Visit.      Patient has been advised of split billing requirements and indicates understanding: Yes     Are you in the first 12 months of your Medicare coverage?  No  Answers for HPI/ROS submitted by the patient on 8/19/2022  PHQ9 TOTAL SCORE: 0      Healthy Habits:     In general, how would you rate your overall health?  Good    Frequency of exercise:  2-3 days/week    Duration of exercise:  15-30 minutes    Do you usually eat at least 4 servings of fruit and vegetables a day, include whole grains    & fiber and avoid regularly eating high fat or \"junk\" foods?  Yes    Taking medications regularly:  Yes    Medication side effects:  Not applicable    Ability to successfully perform activities of daily living:  No assistance needed    Home Safety:  No safety concerns identified    Hearing Impairment:  No hearing concerns    In the past 6 months, have you been bothered by leaking of urine? Yes    In general, how would you rate your overall mental or emotional health?  Excellent      PHQ-2 Total Score: 0    Additional concerns today:  No      Left eye redness and pain- started after eye exam yesterday.  Suspect it is likely a reaction to drops for dilation- sclera red and irritated after appt.  Has called them - last night and they said to watch for now.  Sx's not any worse, but still quite red and irritated.      HRT- still on vivelle patches- feels it helps her mood, feels better in general on it.  Rare hot flashes, not as much any longer.  Last time dose decreased was in ~1/20.    Has done okay with decreased doses, but not when she tried stopping it.  Had return of sx's a few yrs ago when she tried going off completely.    COVID vaccines- has records of 3, thought they got four, but not on their card.    BP recheck- 141/64    Lipids- has always declined statins.  Will hold off on labs today.  Potentially every 2-3 yrs we can " check.    Mammograms - last done in '13.  Declines.  Dexa- will do next year- 3yr follow-up.         Do you feel safe in your environment? Yes    Have you ever done Advance Care Planning? (For example, a Health Directive, POLST, or a discussion with a medical provider or your loved ones about your wishes): Yes, patient states has an Advance Care Planning document and will bring a copy to the clinic.      Fall risk  Fallen 2 or more times in the past year?: No  Any fall with injury in the past year?: No    Cognitive Screening   1) Repeat 3 items (Leader, Season, Table)    2) Clock draw: NORMAL  3) 3 item recall: Recalls NO objects   Results: 0 items recalled: PROBABLE COGNITIVE IMPAIRMENT, **INFORM PROVIDER**    Mini-CogTM Copyright FAISAL Traore. Licensed by the author for use in [facility]; reprinted with permission (mishel@[facility]). All rights reserved.      Do you have sleep apnea, excessive snoring or daytime drowsiness?: no    Reviewed and updated as needed this visit by clinical staff   Tobacco  Allergies  Meds   Med Hx  Surg Hx  Fam Hx  Soc Hx          Reviewed and updated as needed this visit by Provider                   Social History     Tobacco Use     Smoking status: Never Smoker     Smokeless tobacco: Never Used   Substance Use Topics     Alcohol use: Yes     Alcohol/week: 0.0 standard drinks     Comment: 1 serving of  wine per day     If you drink alcohol do you typically have >3 drinks per day or >7 drinks per week? No    Alcohol Use 8/19/2022   Prescreen: >3 drinks/day or >7 drinks/week? No   Prescreen: >3 drinks/day or >7 drinks/week? -   AUDIT SCORE  -     AUDIT - Alcohol Use Disorders Identification Test - Reproduced from the World Health Organization Audit 2001 (Second Edition) 1/12/2018   1.  How often do you have a drink containing alcohol? 2 to 4 times a month   2.  How many drinks containing alcohol do you have on a typical day when you are drinking? 1 or 2   3.  How often do you  have five or more drinks on one occasion? Never   4.  How often during the last year have you found that you were not able to stop drinking once you had started? Never   5.  How often during the last year have you failed to do what was normally expected of you because of drinking? Never   6.  How often during the last year have you needed a first drink in the morning to get yourself going after a heavy drinking session? Never   7.  How often during the last year have you had a feeling of guilt or remorse after drinking? Never   8.  How often during the last year have you been unable to remember what happened the night before because of your drinking? Never   9.  Have you or someone else been injured because of your drinking? No   10. Has a relative, friend, doctor or other health care worker been concerned about your drinking or suggested you cut down? No   TOTAL SCORE 2         Current providers sharing in care for this patient include:   Patient Care Team:  Ekta Solo MD as PCP - General (Family Practice)  Ekta Solo MD as Assigned PCP    The following health maintenance items are reviewed in Epic and correct as of today:  Health Maintenance Due   Topic Date Due     ANNUAL REVIEW OF HM ORDERS  Never done     DEPRESSION ACTION PLAN  Never done     COVID-19 Vaccine (4 - Booster for Moderna series) 03/04/2022       Lab work is in process  Labs reviewed in EPIC  BP Readings from Last 3 Encounters:   08/19/22 (!) 141/64   02/09/21 (!) 145/64   01/24/20 136/71    Wt Readings from Last 3 Encounters:   08/19/22 52.3 kg (115 lb 4.8 oz)   02/09/21 55.2 kg (121 lb 11.2 oz)   01/24/20 54 kg (119 lb 1 oz)                  Patient Active Problem List   Diagnosis     Hyperlipidemia LDL goal <160     Sciatica     Lumbar disc disease     Fractured coccyx (H)     Advance Care Planning     Menopausal syndrome (hot flashes)     Raynaud disease     Detached retina, right     Past Surgical History:   Procedure  Laterality Date     ABDOMEN SURGERY  1980    Hystrdicomy     APPENDECTOMY       CYSTOCELE REPAIR       [facility] OR OCULAR DEVICE INTRAOP DETACHED RETINA  02/22/2018    Dr. López Alcala     HYSTERECTOMY      ? part of left ovary in     PHACOEMULSIFICATION CLEAR CORNEA WITH STANDARD INTRAOCULAR LENS IMPLANT Right 8/14/2018    Procedure: PHACOEMULSIFICATION CLEAR CORNEA WITH STANDARD INTRAOCULAR LENS IMPLANT;  RIGHT EYE PHACOEMULSIFICATION CLEAR CORNEA WITH STANDARD INTRAOCULAR LENS IMPLANT;  Surgeon: Justyn Ly MD;  Location:  [facility]     REPAIR BLADDER         Social History     Tobacco Use     Smoking status: Never Smoker     Smokeless tobacco: Never Used   Substance Use Topics     Alcohol use: Yes     Alcohol/week: 0.0 standard drinks     Comment: 1 serving of  wine per day     Family History   Problem Relation Age of Onset     Hypertension Father      Cancer Other      Alzheimer Disease Other          Current Outpatient Medications   Medication Sig Dispense Refill     estradiol (VIVELLE-DOT) 0.025 MG/24HR bi-weekly patch Place 1 patch onto the skin twice a week 24 patch 3     acetaminophen (TYLENOL) 325 MG tablet Take 325-650 mg by mouth as needed       aspirin (ANACIN) 81 MG EC tablet Take 81 mg by mouth daily       Calcium 250 MG CAPS Take 250 mg by mouth daily        COENZYME Q-10 PO Take 100 mg by mouth       diphenhydrAMINE-acetaminophen (TYLENOL PM EXTRA STRENGTH)  MG tablet Take 1 tablet by mouth At Bedtime Take 1-2 tabs PRN at bedtime       estradiol (VIVELLE-DOT) 0.05 MG/24HR bi-weekly patch Place 1 patch onto the skin twice a week 16 patch 0     hydrOXYzine (VISTARIL) 25 MG capsule Take 1 capsule (25 mg) by mouth 3 times daily as needed for anxiety (take one in morning, and then 1-2 times more during day as needed for panic) 90 capsule 1     magnesium 250 MG tablet Take 1 tablet by mouth daily       Multiple Vitamin (MULTI-VITAMINS) TABS Take 2 tablets by mouth daily       Multiple  "Vitamins-Minerals (PRESERVISION AREDS 2) CAPS        Omega-3 Fatty Acids (FISH OIL OMEGA-3 PO) Take 227 mg by mouth Takes 2 daily       UNABLE TO FIND Take 500 mg by mouth daily MEDICATION NAME: Tumeric       UNABLE TO FIND Take 2 tablets by mouth daily MEDICATION NAME: Mobility helper supplement       UNABLE TO FIND Take 2 capsules by mouth daily MEDICATION NAME: Vitalzem supplement       VITAMIN D, CHOLECALCIFEROL, PO Take 1,000 Units by mouth daily Taking 2 caps once daily (2,000 mg)       Allergies   Allergen Reactions     Codeine Nausea and Vomiting     Recent Labs   Lab Test 01/24/20  1141 01/18/19  1246 01/12/18  1233 12/03/15  0942 10/16/14  1042   * 174* 156* 154* 154*   HDL 74 79 85 73 78   TRIG 89 94 106 97 60   CR  --   --   --  0.68 0.70   GFRESTIMATED  --   --   --  85 82   GFRESTBLACK  --   --   --  >90   GFR Calc   >90   POTASSIUM  --   --   --  3.9 3.7      Pertinent mammograms are reviewed under the imaging tab.    Review of Systems   Eyes: Positive for visual disturbance. Negative for pain.   Constitutional, HEENT, cardiovascular, pulmonary, gi and gu systems are negative, except as otherwise noted.    OBJECTIVE:   BP (!) 151/78   Pulse 72   Temp 97.5  F (36.4  C) (Temporal)   Resp 16   Ht 1.62 m (5' 3.78\")   Wt 52.3 kg (115 lb 4.8 oz)   SpO2 99%   BMI 19.93 kg/m   Estimated body mass index is 19.93 kg/m  as calculated from the following:    Height as of this encounter: 1.62 m (5' 3.78\").    Weight as of this encounter: 52.3 kg (115 lb 4.8 oz).  Physical Exam  GENERAL APPEARANCE: healthy, alert and no distress  EYES: Eyes grossly normal to inspection, PERRL and conjunctivae and sclerae normal  HENT: ear canals and TM's normal, nose and mouth without ulcers or lesions, oropharynx clear and oral mucous membranes moist  NECK: no adenopathy, no asymmetry, masses, or scars and thyroid normal to palpation  RESP: lungs clear to auscultation - no rales, rhonchi or " wheezes  BREAST: normal without masses, tenderness or nipple discharge and no palpable axillary masses or adenopathy  CV: regular rate and rhythm, normal S1 S2, no S3 or S4, no murmur, click or rub, no peripheral edema and peripheral pulses strong  ABDOMEN: soft, nontender, no hepatosplenomegaly, no masses and bowel sounds normal  MS: no musculoskeletal defects are noted and gait is age appropriate without ataxia  SKIN: no suspicious lesions or rashes  NEURO: Normal strength and tone, sensory exam grossly normal, mentation intact and speech normal  PSYCH: mentation appears normal and affect normal/bright    Diagnostic Test Results:  Labs reviewed in Epic      ASSESSMENT / PLAN:       ICD-10-CM    1. Encounter for Medicare annual wellness exam  Z00.00    2. Redness of left eye  H57.89    3. Menopausal syndrome (hot flashes)  N95.1 estradiol (VIVELLE-DOT) 0.025 MG/24HR bi-weekly patch   4. Dysthymia  F34.1 estradiol (VIVELLE-DOT) 0.025 MG/24HR bi-weekly patch   5. Asymptomatic menopausal state  Z78.0 DX Hip/Pelvis/Spine   6. Hyperlipidemia LDL goal <160  E78.5        CPE/Wellness Visit-  Reviewed PMH and medications/supplements. Discussed healthy habits and self cares, healthcare maintenance issues, including cancer screenings (pap, mammogram, colonoscopies as indicated), relevant immunizations, and cardiac risk factor screenings such as for cholesterol, HTN, and DM. Discussed immunizations needed today- they think they've had both COViD boosters, though not in their or our records.  They'll check at their pharmacy- rec getting the second booster if they haven't had it.  See orders for tests and screening needed.       Left eye redness- started after yesterday's eye exam - only intervention was drops for dilation. She will call their office to update on progress.  Sx's are significant but stable today.    Hot flashes/dysthymia- pt has been on HRT via patches for yrs.  Last time dose decreased was 2-3 yrs ago.  Will  "try and go to lowest dose now.  Okay to go back up to 0.05mg patch dose if persistent sx's at lower dose.  Risks and benefits of medication(s) including potential side effects reviewed with patient.  Questions answered.  Understands risks.  Again rec mammogram- last done in '13.  She will consider, but will likely not.    Osteopenia - last dexa in 2/20- rec follow-up dexa in mid-2023.  Would do at [facility] and get mammogram at the same time.    BP recheck- 141/64.  Borderline.  Has not been on BP meds.  Prefers to stay off for now.  Will check at home, and rtc if getting any higher.    Lipids- has always declined statins.  Will hold off on labs today.  Potentially every 2-3 yrs we can check.        Patient has been advised of split billing requirements and indicates understanding: Yes    COUNSELING:  Reviewed preventive health counseling, as reflected in patient instructions    Estimated body mass index is 19.93 kg/m  as calculated from the following:    Height as of this encounter: 1.62 m (5' 3.78\").    Weight as of this encounter: 52.3 kg (115 lb 4.8 oz).    Weight management plan noted, stable and monitoring    She reports that she has never smoked. She has never used smokeless tobacco.      Appropriate preventive services were discussed with this patient, including applicable screening as appropriate for cardiovascular disease, diabetes, osteopenia/osteoporosis, and glaucoma.  As appropriate for age/gender, discussed screening for colorectal cancer, prostate cancer, breast cancer, and cervical cancer. Checklist reviewing preventive services available has been given to the patient.    Reviewed patients plan of care and provided an AVS. The Basic Care Plan (routine screening as documented in Health Maintenance) for Amada meets the Care Plan requirement. This Care Plan has been established and reviewed with the Patient.    Counseling Resources:  ATP IV Guidelines  Pooled Cohorts Equation " Calculator  Breast Cancer Risk Calculator  Breast Cancer: Medication to Reduce Risk  FRAX Risk Assessment  ICSI Preventive Guidelines  Dietary Guidelines for Americans, 2010  [employer]'s MyPlate  ASA Prophylaxis  Lung CA Screening    Ekta Solo MD  [facility]    Identified Health Risks:

## 2022-08-19 NOTE — PATIENT INSTRUCTIONS
Patient Education   Personalized Prevention Plan  You are due for the preventive services outlined below.  Your care team is available to assist you in scheduling these services.  If you have already completed any of these items, please share that information with your care team to update in your medical record.  Health Maintenance Due   Topic Date Due     ANNUAL REVIEW OF HM ORDERS  Never done     Depression Action Plan  Never done     Annual Wellness Visit  02/09/2022     COVID-19 Vaccine (4 - Booster for Moderna series) 03/04/2022

## 2022-08-30 ENCOUNTER — TELEPHONE (OUTPATIENT)
Dept: FAMILY MEDICINE | Facility: CLINIC | Age: 82
End: 2022-08-30

## 2022-08-30 DIAGNOSIS — F34.1 DYSTHYMIA: ICD-10-CM

## 2022-08-30 DIAGNOSIS — N95.1 MENOPAUSAL SYNDROME (HOT FLASHES): ICD-10-CM

## 2022-08-30 RX ORDER — ESTRADIOL 0.03 MG/D
1 FILM, EXTENDED RELEASE TRANSDERMAL
Qty: 24 PATCH | Refills: 3 | Status: SHIPPED | OUTPATIENT
Start: 2022-09-01 | End: 2023-07-28

## 2022-10-22 ENCOUNTER — HEALTH MAINTENANCE LETTER (OUTPATIENT)
Age: 82
End: 2022-10-22

## 2022-12-20 NOTE — PATIENT INSTRUCTIONS
Preventive Health Recommendations    See your health care provider every year to    Review health changes.     Discuss preventive care.      Review your medicines if your doctor has prescribed any.      You no longer need a yearly Pap test unless you've had an abnormal Pap test in the past 10 years. If you have vaginal symptoms, such as bleeding or discharge, be sure to talk with your provider about a Pap test.      Every 1 to 2 years, have a mammogram.  If you are over 69, talk with your health care provider about whether or not you want to continue having screening mammograms.      Every 10 years, have a colonoscopy. Or, have a yearly FIT test (stool test). These exams will check for colon cancer.       Have a cholesterol test every 5 years, or more often if your doctor advises it.       Have a diabetes test (fasting glucose) every three years. If you are at risk for diabetes, you should have this test more often.       At age 65, have a bone density scan (DEXA) to check for osteoporosis (brittle bone disease).    Shots:    Get a flu shot each year.    Get a tetanus shot every 10 years.    Talk to your doctor about your pneumonia vaccines. There are now two you should receive - Pneumovax (PPSV 23) and Prevnar (PCV 13).    Talk to your pharmacist about the shingles vaccine.    Talk to your doctor about the hepatitis B vaccine.    Nutrition:     Eat at least 5 servings of fruits and vegetables each day.      Eat whole-grain bread, whole-wheat pasta and brown rice instead of white grains and rice.      Get adequate Calcium and Vitamin D.     Lifestyle    Exercise at least 150 minutes a week (30 minutes a day, 5 days a week). This will help you control your weight and prevent disease.      Limit alcohol to one drink per day.      No smoking.       Wear sunscreen to prevent skin cancer.       See your dentist twice a year for an exam and cleaning.      See your eye doctor every 1 to 2 years to screen for conditions  Medication(s) requested: norco  Last office visit: 12/7/22  Next office visit: 3/28/23  Last refill given: 11/25/22  Last refill picked up from pharmacy: 11/26/22  Contract present: yes  Date of contract: 5/16/22  Last urine drug screen: 5/16/22    Is the patient due for refill of this medication(s): Yes  PDMP review: Criteria met. Forwarded to Physician/ERICKSON for signature.       such as glaucoma, macular degeneration and cataracts.    Personalized Prevention Plan  You are due for the preventive services outlined below.  Your care team is available to assist you in scheduling these services.  If you have already completed any of these items, please share that information with your care team to update in your medical record.  Health Maintenance Due   Topic Date Due     Diptheria Tetanus Pertussis (DTAP/TDAP/TD) Vaccine (1 - Tdap) 03/23/1967     Zoster (Chicken Pox) Vaccine (1 of 2) 03/23/1992     FALL RISK ASSESSMENT  01/12/2019     Depression Assessment 2 - yearly  01/12/2019

## 2023-07-20 ENCOUNTER — PATIENT OUTREACH (OUTPATIENT)
Dept: CARE COORDINATION | Facility: CLINIC | Age: 83
End: 2023-07-20
Payer: COMMERCIAL

## 2023-07-28 DIAGNOSIS — N95.1 MENOPAUSAL SYNDROME (HOT FLASHES): ICD-10-CM

## 2023-07-28 DIAGNOSIS — F34.1 DYSTHYMIA: ICD-10-CM

## 2023-07-28 RX ORDER — ESTRADIOL 0.03 MG/D
FILM, EXTENDED RELEASE TRANSDERMAL
Qty: 24 PATCH | Refills: 1 | Status: SHIPPED | OUTPATIENT
Start: 2023-07-28 | End: 2024-02-02

## 2023-07-28 NOTE — TELEPHONE ENCOUNTER
"Requested Prescriptions   Pending Prescriptions Disp Refills    estradiol (VIVELLE-DOT) 0.025 MG/24HR bi-weekly patch [Pharmacy Med Name: ESTRADIOL 0.025 MG PATCH(2/WK)] 24 patch 3     Sig: APPLY 1 PATCH TWICE A WEEK       Hormone Replacement Therapy Failed - 7/28/2023 12:42 AM        Failed - Blood pressure under 140/90 in past 12 months     BP Readings from Last 3 Encounters:   08/19/22 (!) 141/64   02/09/21 (!) 145/64   01/24/20 136/71                 Passed - Recent (12 mo) or future (30 days) visit within the authorizing provider's specialty     Patient has had an office visit with the authorizing provider or a provider within the authorizing providers department within the previous 12 mos or has a future within next 30 days. See \"Patient Info\" tab in inbasket, or \"Choose Columns\" in Meds & Orders section of the refill encounter.              Passed - Medication is active on med list        Passed - Patient is 18 years of age or older        Passed - No active pregnancy on record        Passed - No positive pregnancy test on record in past 12 months           Last OV on 8/19/22. [facility] sent to pt to schedule annual visit.    Robe Espinoza RN   [facility]    "

## 2023-08-03 ENCOUNTER — PATIENT OUTREACH (OUTPATIENT)
Dept: CARE COORDINATION | Facility: CLINIC | Age: 83
End: 2023-08-03
Payer: COMMERCIAL

## 2023-10-21 ENCOUNTER — MEDICAL CORRESPONDENCE (OUTPATIENT)
Dept: HEALTH INFORMATION MANAGEMENT | Facility: CLINIC | Age: 83
End: 2023-10-21
Payer: COMMERCIAL

## 2023-11-05 ENCOUNTER — HEALTH MAINTENANCE LETTER (OUTPATIENT)
Age: 83
End: 2023-11-05

## 2024-02-02 DIAGNOSIS — N95.1 MENOPAUSAL SYNDROME (HOT FLASHES): ICD-10-CM

## 2024-02-02 DIAGNOSIS — F34.1 DYSTHYMIA: ICD-10-CM

## 2024-02-02 RX ORDER — ESTRADIOL 0.03 MG/D
FILM, EXTENDED RELEASE TRANSDERMAL
Qty: 8 PATCH | Refills: 0 | Status: SHIPPED | OUTPATIENT
Start: 2024-02-02 | End: 2024-02-29

## 2024-02-02 NOTE — TELEPHONE ENCOUNTER
#8 sent.  Just 1 month as pt's last appt 8/22, no future appts scheduled.  Msg to pharmacy on rx-- 'Please remind patient they are overdue for follow-up appointment'.  Mark Solo MD

## 2024-02-29 DIAGNOSIS — F34.1 DYSTHYMIA: ICD-10-CM

## 2024-02-29 DIAGNOSIS — N95.1 MENOPAUSAL SYNDROME (HOT FLASHES): ICD-10-CM

## 2024-02-29 RX ORDER — ESTRADIOL 0.03 MG/D
FILM, EXTENDED RELEASE TRANSDERMAL
Qty: 8 PATCH | Refills: 0 | Status: SHIPPED | OUTPATIENT
Start: 2024-02-29 | End: 2024-03-27

## 2024-02-29 NOTE — TELEPHONE ENCOUNTER
Patient calling for refill of estradiol   Did not receive prior message from pharmacy  Regarding need to schedule follow up  Scheduled for annual wellness first available appointment 3/27  Requesting bridge of medication if possible  Will be out this Saturday   Please advise  Thanks,  Arlette UREÑA RN

## 2024-03-21 DIAGNOSIS — F34.1 DYSTHYMIA: ICD-10-CM

## 2024-03-21 DIAGNOSIS — N95.1 MENOPAUSAL SYNDROME (HOT FLASHES): ICD-10-CM

## 2024-03-22 NOTE — TELEPHONE ENCOUNTER
Will refill at upcoming appt w/ discussion.  Last rx should last until then (2/29/24).  Thanks- CW

## 2024-03-26 NOTE — PROGRESS NOTES
Preventive Care Visit  [facility]  Ekta Solo MD, Family Medicine  Mar 27, 2024  {Provider  Link to SmartSet :348233}    {PROVIDER CHARTING PREFERENCE:406984}    Dave Ybarra is a 84 year old, presenting for the following:  No chief complaint on file.        3/27/2024     9:58 AM   Additional Questions   Roomed by IVY Presley   Accompanied by Ayden - spouse     {ROOMER if patient is in their first year of Medicare a vision screen is required click here to document the Vison screen and then refresh the note to pull in results  :911913}    Health Care Directive  Patient has a Health Care Directive on file  Advance care planning document is on file but is outdated.  Patient encouraged to updated.    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 Good         8/19/2022   Nutrition   At least 4 servings of fruits and vegetables/day Yes         8/19/2022   Exercise   Frequency of exercise: 2-3 days/week            8/19/2022   Fall Risk   Fallen 2 or more times in the past year? No    No          8/19/2022   Activities of Daily Living- Home Safety   Needs help with the following daily activites NO assistance is needed   Safety concerns in the home None of the above         1/12/2018    Dental   Dentist two times every year? Yes         8/19/2022   Hearing Screening   Hearing concerns? No concerns            No data to display                   { Rooming Staff Patient needs a PHQ as part of the AWV.  Use this link to complete and then refresh the note to pull results Link to PHQ9 Assessment :525608}  {USE TO PULL IN PHQ RESULTS FOR TODAY:160920}        8/19/2022   Substance Use   Alcohol more than 3/day or more than 7/wk No     Social History     Tobacco Use    Smoking status: Never    Smokeless tobacco: Never   Substance Use Topics    Alcohol use: Yes     Alcohol/week: 0.0 standard drinks of alcohol     Comment: 1 serving of  wine per day    Drug use: No     {Provider  If there are gaps in the social history shown above, please follow the link to update and then refresh the note Link to Social and Substance History :230938}     {Mammogram Decision Support (Optional):739041}      {Link to Fracture Risk Assessment Tool (Optional):947861}    {Provider  Use the storyboard to review patient history, after sections have been marked as reviewed, refresh note to capture documentation:698053}  {Provider   REQUIRED AWV use this link to review and update sexual activity history  after section has been marked as reviewed, refresh note to capture documentation:605017}  Reviewed and updated as needed this visit by Provider                    {HISTORY OPTIONS (Optional):757417}  Current providers sharing in care for this patient include:  Patient Care Team:  Ekta Solo MD as PCP - General (Family Practice)  Ekta Solo MD as Assigned PCP    The following health maintenance items are reviewed in Epic and correct as of today:  Health Maintenance   Topic Date Due    ANNUAL REVIEW OF HM ORDERS  Never done    DEPRESSION ACTION PLAN  Never done    RSV VACCINE (Pregnancy & 60+) (1 - 1-dose 60+ series) Never done    LIPID  01/24/2021    DEXA  02/03/2023    PHQ-9  02/19/2023    MEDICARE  "ANNUAL WELLNESS VISIT  08/19/2023    FALL RISK ASSESSMENT  08/19/2023    INFLUENZA VACCINE (1) 09/01/2023    COVID-19 Vaccine (4 - 2023-24 season) 09/01/2023    MAMMO SCREENING  03/22/2024    ADVANCE CARE PLANNING  08/22/2027    DTAP/TDAP/TD IMMUNIZATION (2 - Td or Tdap) 01/18/2029    Pneumococcal Vaccine: 65+ Years  Completed    ZOSTER IMMUNIZATION  Completed    IPV IMMUNIZATION  Aged Out    HPV IMMUNIZATION  Aged Out    MENINGITIS IMMUNIZATION  Aged Out    RSV MONOCLONAL ANTIBODY  Aged Out       {ROS Picklists (Optional):577374}     Objective    Exam  There were no vitals taken for this visit.   Estimated body mass index is 19.93 kg/m  as calculated from the following:    Height as of 8/19/22: 1.62 m (5' 3.78\").    Weight as of 8/19/22: 52.3 kg (115 lb 4.8 oz).    Physical Exam  {Exam Choices (Optional):208620}  {Provider  The Mini-Cog is incomplete, use link to complete and refresh note Link to Mini-Cog :669522}       No data to display              {A Mini-Cog total score of 0-2 suggests the possibility of dementia, score of 3-5 suggests no dementia:457240}         Signed Electronically by: Ekta Solo MD  {Email feedback regarding this note to [email or web address]   :508954}  Answers submitted by the patient for this visit:  Patient Health Questionnaire (Submitted on 3/27/2024)  PHQ9 TOTAL SCORE: 0  CORNEL-7 (Submitted on 3/27/2024)  CORNEL 7 TOTAL SCORE: 0    "

## 2024-03-27 ENCOUNTER — OFFICE VISIT (OUTPATIENT)
Dept: FAMILY MEDICINE | Facility: CLINIC | Age: 84
End: 2024-03-27
Payer: COMMERCIAL

## 2024-03-27 VITALS
BODY MASS INDEX: 19.27 KG/M2 | HEIGHT: 64 IN | HEART RATE: 75 BPM | SYSTOLIC BLOOD PRESSURE: 168 MMHG | OXYGEN SATURATION: 98 % | RESPIRATION RATE: 16 BRPM | WEIGHT: 112.9 LBS | DIASTOLIC BLOOD PRESSURE: 82 MMHG | TEMPERATURE: 98.2 F

## 2024-03-27 DIAGNOSIS — Z29.11 NEED FOR VACCINATION AGAINST RESPIRATORY SYNCYTIAL VIRUS: ICD-10-CM

## 2024-03-27 DIAGNOSIS — Z12.31 VISIT FOR SCREENING MAMMOGRAM: ICD-10-CM

## 2024-03-27 DIAGNOSIS — E78.5 HYPERLIPIDEMIA LDL GOAL <160: ICD-10-CM

## 2024-03-27 DIAGNOSIS — Z00.00 ENCOUNTER FOR MEDICARE ANNUAL WELLNESS EXAM: Primary | ICD-10-CM

## 2024-03-27 DIAGNOSIS — F34.1 DYSTHYMIA: ICD-10-CM

## 2024-03-27 DIAGNOSIS — R03.0 ELEVATED BLOOD PRESSURE READING WITHOUT DIAGNOSIS OF HYPERTENSION: ICD-10-CM

## 2024-03-27 DIAGNOSIS — N95.1 MENOPAUSAL SYNDROME (HOT FLASHES): ICD-10-CM

## 2024-03-27 DIAGNOSIS — Z78.0 ASYMPTOMATIC MENOPAUSAL STATE: ICD-10-CM

## 2024-03-27 DIAGNOSIS — R41.3 MEMORY CHANGES: ICD-10-CM

## 2024-03-27 LAB
ANION GAP SERPL CALCULATED.3IONS-SCNC: 11 MMOL/L (ref 7–15)
BUN SERPL-MCNC: 10.9 MG/DL (ref 8–23)
CALCIUM SERPL-MCNC: 9.5 MG/DL (ref 8.8–10.2)
CHLORIDE SERPL-SCNC: 101 MMOL/L (ref 98–107)
CREAT SERPL-MCNC: 0.82 MG/DL (ref 0.51–0.95)
CREAT UR-MCNC: 96.5 MG/DL
DEPRECATED HCO3 PLAS-SCNC: 27 MMOL/L (ref 22–29)
EGFRCR SERPLBLD CKD-EPI 2021: 71 ML/MIN/1.73M2
GLUCOSE SERPL-MCNC: 93 MG/DL (ref 70–99)
MICROALBUMIN UR-MCNC: 17.5 MG/L
MICROALBUMIN/CREAT UR: 18.13 MG/G CR (ref 0–25)
POTASSIUM SERPL-SCNC: 4.2 MMOL/L (ref 3.4–5.3)
SODIUM SERPL-SCNC: 139 MMOL/L (ref 135–145)

## 2024-03-27 PROCEDURE — 80048 BASIC METABOLIC PNL TOTAL CA: CPT | Performed by: FAMILY MEDICINE

## 2024-03-27 PROCEDURE — 99214 OFFICE O/P EST MOD 30 MIN: CPT | Mod: 25 | Performed by: FAMILY MEDICINE

## 2024-03-27 PROCEDURE — 82043 UR ALBUMIN QUANTITATIVE: CPT | Performed by: FAMILY MEDICINE

## 2024-03-27 PROCEDURE — 82570 ASSAY OF URINE CREATININE: CPT | Performed by: FAMILY MEDICINE

## 2024-03-27 PROCEDURE — G0439 PPPS, SUBSEQ VISIT: HCPCS | Performed by: FAMILY MEDICINE

## 2024-03-27 PROCEDURE — 36415 COLL VENOUS BLD VENIPUNCTURE: CPT | Performed by: FAMILY MEDICINE

## 2024-03-27 RX ORDER — ESTRADIOL 0.03 MG/D
FILM, EXTENDED RELEASE TRANSDERMAL
Qty: 24 PATCH | Refills: 3 | Status: SHIPPED | OUTPATIENT
Start: 2024-03-27 | End: 2024-07-08

## 2024-03-27 RX ORDER — RESPIRATORY SYNCYTIAL VIRUS VACCINE 120MCG/0.5
0.5 KIT INTRAMUSCULAR ONCE
Qty: 1 EACH | Refills: 0 | Status: CANCELLED | OUTPATIENT
Start: 2024-03-27 | End: 2024-03-27

## 2024-03-27 RX ORDER — ESTRADIOL 0.03 MG/D
FILM, EXTENDED RELEASE TRANSDERMAL
Qty: 24 PATCH | Refills: 1 | OUTPATIENT
Start: 2024-03-27

## 2024-03-27 SDOH — HEALTH STABILITY: PHYSICAL HEALTH: ON AVERAGE, HOW MANY DAYS PER WEEK DO YOU ENGAGE IN MODERATE TO STRENUOUS EXERCISE (LIKE A BRISK WALK)?: 3 DAYS

## 2024-03-27 ASSESSMENT — ANXIETY QUESTIONNAIRES
GAD7 TOTAL SCORE: 0
4. TROUBLE RELAXING: NOT AT ALL
1. FEELING NERVOUS, ANXIOUS, OR ON EDGE: NOT AT ALL
7. FEELING AFRAID AS IF SOMETHING AWFUL MIGHT HAPPEN: NOT AT ALL
7. FEELING AFRAID AS IF SOMETHING AWFUL MIGHT HAPPEN: NOT AT ALL
5. BEING SO RESTLESS THAT IT IS HARD TO SIT STILL: NOT AT ALL
2. NOT BEING ABLE TO STOP OR CONTROL WORRYING: NOT AT ALL
GAD7 TOTAL SCORE: 0
3. WORRYING TOO MUCH ABOUT DIFFERENT THINGS: NOT AT ALL
GAD7 TOTAL SCORE: 0
6. BECOMING EASILY ANNOYED OR IRRITABLE: NOT AT ALL

## 2024-03-27 ASSESSMENT — PATIENT HEALTH QUESTIONNAIRE - PHQ9
SUM OF ALL RESPONSES TO PHQ QUESTIONS 1-9: 0
SUM OF ALL RESPONSES TO PHQ QUESTIONS 1-9: 0

## 2024-03-27 ASSESSMENT — PAIN SCALES - GENERAL: PAINLEVEL: NO PAIN (0)

## 2024-03-27 ASSESSMENT — SOCIAL DETERMINANTS OF HEALTH (SDOH): HOW OFTEN DO YOU GET TOGETHER WITH FRIENDS OR RELATIVES?: TWICE A WEEK

## 2024-03-27 NOTE — LETTER
My Depression Action Plan  Name: Amada Hurd   Date of Birth 1940  Date: 3/27/2024    My doctor: Ekta Solo   My clinic: [facility]  [address]  [place] [address] [phone]            GREEN    ZONE   Good Control    What it looks like:   Things are going generally well. You have normal ups and downs. You may even feel depressed from time to time, but bad moods usually last less than a day.   What you need to do:  Continue to care for yourself (see self care plan)  Check your depression survival kit and update it as needed  Follow your physician s recommendations including any medication.  Do not stop taking medication unless you consult with your physician first.             YELLOW         ZONE Getting Worse    What it looks like:   Depression is starting to interfere with your life.   It may be hard to get out of bed; you may be starting to isolate yourself from others.  Symptoms of depression are starting to last most all day and this has happened for several days.   You may have suicidal thoughts but they are not constant.   What you need to do:     Call your care team. Your response to treatment will improve if you keep your care team informed of your progress. Yellow periods are signs an adjustment may need to be made.     Continue your self-care.  Just get dressed and ready for the day.  Don't give yourself time to talk yourself out of it.    Talk to someone in your support network.    Open up your Depression Self-Care Plan/Wellness Kit.             RED    ZONE Medical Alert - Get Help    What it looks like:   Depression is seriously interfering with your life.   You may experience these or other symptoms: You can t get out of bed most days, can t work or engage in other necessary activities, you have trouble taking care of basic hygiene, or basic responsibilities, thoughts of suicide or death that will not go away,  self-injurious behavior.     What you need to do:  Call your care team and request a same-day appointment. If they are not available (weekends or after hours) call your local crisis line, emergency room or 911.          Depression Self-Care Plan / Wellness Kit    Many people find that medication and therapy are helpful treatments for managing depression. In addition, making small changes to your everyday life can help to boost your mood and improve your wellbeing. Below are some tips for you to consider. Be sure to talk with your medical provider and/or behavioral health consultant if your symptoms are worsening or not improving.     Sleep   Sleep hygiene  means all of the habits that support good, restful sleep. It includes maintaining a consistent bedtime and wake time, using your bedroom only for sleeping or sex, and keeping the bedroom dark and free of distractions like a computer, smartphone, or television.     Develop a Healthy Routine  Maintain good hygiene. Get out of bed in the morning, make your bed, brush your teeth, take a shower, and get dressed. Don t spend too much time viewing media that makes you feel stressed. Find time to relax each day.    Exercise  Get some form of exercise every day. This will help reduce pain and release endorphins, the  feel good  chemicals in your brain. It can be as simple as just going for a walk or doing some gardening, anything that will get you moving.      Diet  Strive to eat healthy foods, including fruits and vegetables. Drink plenty of water. Avoid excessive sugar, caffeine, alcohol, and other mood-altering substances.     Stay Connected with Others  Stay in touch with friends and family members.    Manage Your Mood  Try deep breathing, massage therapy, biofeedback, or meditation. Take part in fun activities when you can. Try to find something to smile about each day.     Psychotherapy  Be open to working with a therapist if your provider recommends it.      Medication  Be sure to take your medication as prescribed. Most anti-depressants need to be taken every day. It usually takes several weeks for medications to work. Not all medicines work for all people. It is important to follow-up with your provider to make sure you have a treatment plan that is working for you. Do not stop your medication abruptly without first discussing it with your provider.    Crisis Resources   These hotlines are for both adults and children. They and are open 24 hours a day, 7 days a week unless noted otherwise.    National Suicide Prevention Lifeline   988 or 7-069-703-ZKUA (6516)    Crisis Text Line    www.crisistextline.org  Text HOME to 290721 from anywhere in the [place], anytime, about any type of crisis. A live, trained crisis counselor will receive the text and respond quickly.    [place] Lifeline for LGBTQ Youth  A national crisis intervention and suicide lifeline for LGBTQ youth under 25. Provides a safe place to talk without judgement. Call 1-842.759.7184; text START to 583979 or visit www.thetrevorproject.org to talk to a trained counselor.    For [facility] crisis numbers, visit the [facility] website at:  https://[place].gov/dhs/people-we-serve/adults/health-care/mental-health/resources/crisis-contacts.jsp
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
## 2024-03-27 NOTE — PATIENT INSTRUCTIONS
Preventive Care Advice   This is general advice given by our system to help you stay healthy. However, your care team may have specific advice just for you. Please talk to your care team about your preventive care needs.  Nutrition  Eat 5 or more servings of fruits and vegetables each day.  Try wheat bread, brown rice and whole grain pasta (instead of white bread, rice, and pasta).  Get enough calcium and vitamin D. Check the label on foods and aim for 100% of the RDA (recommended daily allowance).  Lifestyle  Exercise at least 150 minutes each week   (30 minutes a day, 5 days a week).  Do muscle strengthening activities 2 days a week. These help control your weight and prevent disease.  No smoking.  Wear sunscreen to prevent skin cancer.  Have a dental exam and cleaning every 6 months.  Yearly exams  See your health care team every year to talk about:  Any changes in your health.  Any medicines your care team has prescribed.  Preventive care, family planning, and ways to prevent chronic diseases.  Shots (vaccines)   HPV shots (up to age 26), if you've never had them before.  Hepatitis B shots (up to age 59), if you've never had them before.  COVID-19 shot: Get this shot when it's due.  Flu shot: Get a flu shot every year.  Tetanus shot: Get a tetanus shot every 10 years.  Pneumococcal, hepatitis A, and RSV shots: Ask your care team if you need these based on your risk.  Shingles shot (for age 50 and up).  General health tests  Diabetes screening:  Starting at age 35, Get screened for diabetes at least every 3 years.  If you are younger than age 35, ask your care team if you should be screened for diabetes.  Cholesterol test: At age 39, start having a cholesterol test every 5 years, or more often if advised.  Bone density scan (DEXA): At age 50, ask your care team if you should have this scan for osteoporosis (brittle bones).  Hepatitis C: Get tested at least once in your life.  STIs (sexually transmitted  infections)  Before age 24: Ask your care team if you should be screened for STIs.  After age 24: Get screened for STIs if you're at risk. You are at risk for STIs (including HIV) if:  You are sexually active with more than one person.  You don't use condoms every time.  You or a partner was diagnosed with a sexually transmitted infection.  If you are at risk for HIV, ask about PrEP medicine to prevent HIV.  Get tested for HIV at least once in your life, whether you are at risk for HIV or not.  Cancer screening tests  Cervical cancer screening: If you have a cervix, begin getting regular cervical cancer screening tests at age 21. Most people who have regular screenings with normal results can stop after age 65. Talk about this with your provider.  Breast cancer scan (mammogram): If you've ever had breasts, begin having regular mammograms starting at age 40. This is a scan to check for breast cancer.  Colon cancer screening: It is important to start screening for colon cancer at age 45.  Have a colonoscopy test every 10 years (or more often if you're at risk) Or, ask your provider about stool tests like a FIT test every year or Cologuard test every 3 years.  To learn more about your testing options, visit: https://www.[employer]/325026.pdf.  For help making a decision, visit: https://bit.ly/ie20238.  Prostate cancer screening test: If you have a prostate and are age 55 to 69, ask your provider if you would benefit from a yearly prostate cancer screening test.  Lung cancer screening: If you are a current or former smoker age 50 to 80, ask your care team if ongoing lung cancer screenings are right for you.  For informational purposes only. Not to replace the advice of your health care provider. Copyright   2023 [employer]. All rights reserved. Clinically reviewed by the [facility] Transitions Program. [employer] [id number] - REV 01/24.

## 2024-04-01 NOTE — RESULT ENCOUNTER NOTE
-Your basic metabolic panel (which includes electrolyte levels, blood sugar level and kidney function tests) is normal.  -Your urine microalbumin level (which is the urine test that can signal signs of early chronic kidney disease if elevated to >30) is in a normal range as well.    Please let me know if you have any questions.  Best,   Mark Solo MD

## 2024-06-02 ENCOUNTER — HEALTH MAINTENANCE LETTER (OUTPATIENT)
Age: 84
End: 2024-06-02

## 2024-06-26 ENCOUNTER — APPOINTMENT (OUTPATIENT)
Dept: URBAN - METROPOLITAN AREA CLINIC 256 | Age: 84
Setting detail: DERMATOLOGY
End: 2024-06-26

## 2024-06-26 VITALS — WEIGHT: 120 LBS | HEIGHT: 65 IN

## 2024-06-26 DIAGNOSIS — L21.8 OTHER SEBORRHEIC DERMATITIS: ICD-10-CM

## 2024-06-26 PROCEDURE — OTHER COUNSELING: OTHER

## 2024-06-26 PROCEDURE — OTHER PRESCRIPTION MEDICATION MANAGEMENT: OTHER

## 2024-06-26 PROCEDURE — OTHER PRESCRIPTION: OTHER

## 2024-06-26 PROCEDURE — OTHER PATIENT SPECIFIC COUNSELING: OTHER

## 2024-06-26 PROCEDURE — 99204 OFFICE O/P NEW MOD 45 MIN: CPT

## 2024-06-26 PROCEDURE — OTHER PHOTO-DOCUMENTATION: OTHER

## 2024-06-26 RX ORDER — HYDROCORTISONE 25 MG/G
CREAM TOPICAL BID
Qty: 30 | Refills: 0 | Status: ERX | COMMUNITY
Start: 2024-06-26

## 2024-06-26 RX ORDER — KETOCONAZOLE 20 MG/ML
SHAMPOO, SUSPENSION TOPICAL EVERY OTHER DAY
Qty: 30 | Refills: 0 | Status: ERX | COMMUNITY
Start: 2024-06-26

## 2024-06-26 ASSESSMENT — LOCATION DETAILED DESCRIPTION DERM
LOCATION DETAILED: LEFT INFERIOR CENTRAL MALAR CHEEK
LOCATION DETAILED: RIGHT INFERIOR MEDIAL MALAR CHEEK

## 2024-06-26 ASSESSMENT — LOCATION ZONE DERM: LOCATION ZONE: FACE

## 2024-06-26 ASSESSMENT — LOCATION SIMPLE DESCRIPTION DERM
LOCATION SIMPLE: RIGHT CHEEK
LOCATION SIMPLE: LEFT CHEEK

## 2024-06-26 NOTE — HPI: RASH
What Type Of Note Output Would You Prefer (Optional)?: Standard Output
Is This A New Presentation, Or A Follow-Up?: Rash
Additional History: Off and on\\nStress?

## 2024-06-26 NOTE — PROCEDURE: PATIENT SPECIFIC COUNSELING
- Informed patient stress can exacerbate symptoms\\n- Informed patient condition can be chronic, or it may resolve\\n- Discussed treatment options of ketoconazole shampoo as face wash vs topical steroids\\n- Patient decided to proceed with ketoconazole 2% shampoo and hydrocortisone 2.5% cream for treatment\\n- Advised patient to do treatment regimen for two weeks, then PRN for maintenance\\n- Informed patient Ketoconazole 2% shampoo can be used longer than 2 weeks\\n- Advised patient to follow up in 1 month\\n- Patient was agreeable
Detail Level: Simple

## 2024-06-26 NOTE — PROCEDURE: PRESCRIPTION MEDICATION MANAGEMENT
Plan: RTC in 1 month, sooner if worsening.
Detail Level: Zone
Render In Strict Bullet Format?: No
Initiate Treatment: Wash face twice daily with ketoconazole 2 % shampoo. Let shampoo sit for 1-2 minutes prior to rinsing.\\nWith flares, apply hydrocortisone 2.5 % topical cream to affected areas on the face 1-2 times daily until clear or for up to 2 weeks. No more than 14 days monthly

## 2024-07-08 DIAGNOSIS — F34.1 DYSTHYMIA: ICD-10-CM

## 2024-07-08 DIAGNOSIS — N95.1 MENOPAUSAL SYNDROME (HOT FLASHES): ICD-10-CM

## 2024-07-08 RX ORDER — ESTRADIOL 0.03 MG/D
FILM, EXTENDED RELEASE TRANSDERMAL
Qty: 8 PATCH | Refills: 0 | Status: SHIPPED | OUTPATIENT
Start: 2024-07-08 | End: 2024-08-02

## 2024-07-08 NOTE — TELEPHONE ENCOUNTER
Left message for patient to call [facility] back  When patient calls back please transfer to an RN  Johanne LANDON RN

## 2024-07-08 NOTE — TELEPHONE ENCOUNTER
Reviewed pt's chart.  See a/p notes below.  Last appt in 3/24, BP high.  Follow-up recs as below, no appts since, no calls to report okay home BPs.  Pt does have likely mild cognitive impairment, so may not remember.    Please reach out to pt/ have have them schedule a follow-up high BP appt (unless BPs okay at home, ~SBPs <145 most of the time).    1 month of estradiol patches sent in meantime.    Thanks!  CW        3/27/24 a/p notes...  Elevated blood pressure reading without diagnosis of hypertension  BP high today and similarly high on recheck. Pt notes she usually is high only in the office, but they have not been checking at home. They do have a home BP monitor- will try and check and record.  If SBP >140, msg and we can do a video visit to start meds prior to follow-up appt.  Otherwise follow-up in ~2 months with home BP monitor to check both together.  - Home Blood Pressure Monitor Order for DME - ONLY FOR DME  - PRIMARY CARE FOLLOW-UP SCHEDULING; Future  - Basic metabolic panel  (Ca, Cl, CO2, Creat, Gluc, K, Na, BUN); Future  - Albumin Random Urine Quantitative with Creat Ratio; Future  - Basic metabolic panel  (Ca, Cl, CO2, Creat, Gluc, K, Na, BUN)  - Albumin Random Urine Quantitative with Creat Ratio     Memory changes  Abnl screening memory test, and on the 6CIT follow-up test she scored ~17/28, and >8 is considered abnormal.   lives with her, and does bills/driving/groceries, and feels she's been safe in doing meals/laundry.  Will refer to neurology for eval and recommendations.  - Adult Neurology  Referral; Future     Menopausal syndrome (hot flashes)/  Dysthymia  Pt feels she did okay with the lower dose estrogen patch, but does feels generally worse if she stops it, and  agrees, though no specific sx's noted.  They are both aware of risks of continued HRT, and wish to continue.  Refills sent.  - estradiol (VIVELLE-DOT) 0.025 MG/24HR bi-weekly patch; APPLY 1 PATCH TWICE  WEEKLY     Asymptomatic menopausal state  Due for DXA recheck.  Will need to do video visit if worse and medications needed.   feels he could help her take weekly medication (in am, upright prior to meds/eating) without issue.  - DEXA HIP/PELVIS/SPINE - Future; Future     Hyperlipidemia LDL goal <160  Will forgo screening lipids as she has never been interested in lipid lowering medications.             Follow-up Visit   Expected date:  May 27, 2024 (Approximate)        Follow Up Appointment Details:      Follow-up with whom?: Me     Follow-Up for what?: Chronic Disease f/u     Chronic Disease f/u: Hypertension Comment - If home blood pressures >140, call for video appointment sooner     How?: In Person

## 2024-07-18 NOTE — TELEPHONE ENCOUNTER
TC's,      Please see messages below.  Can someone try to call patient/ again?    Thank you!  Michelle Draper RN

## 2024-07-24 ENCOUNTER — APPOINTMENT (OUTPATIENT)
Dept: URBAN - METROPOLITAN AREA CLINIC 256 | Age: 84
Setting detail: DERMATOLOGY
End: 2024-07-24

## 2024-07-24 VITALS — WEIGHT: 110 LBS | HEIGHT: 64 IN

## 2024-07-24 DIAGNOSIS — L21.8 OTHER SEBORRHEIC DERMATITIS: ICD-10-CM

## 2024-07-24 PROCEDURE — OTHER PATIENT SPECIFIC COUNSELING: OTHER

## 2024-07-24 PROCEDURE — OTHER PHOTO-DOCUMENTATION: OTHER

## 2024-07-24 PROCEDURE — OTHER COUNSELING: OTHER

## 2024-07-24 PROCEDURE — OTHER PRESCRIPTION MEDICATION MANAGEMENT: OTHER

## 2024-07-24 PROCEDURE — OTHER MIPS QUALITY: OTHER

## 2024-07-24 PROCEDURE — 99214 OFFICE O/P EST MOD 30 MIN: CPT

## 2024-07-24 ASSESSMENT — LOCATION DETAILED DESCRIPTION DERM
LOCATION DETAILED: RIGHT INFERIOR MEDIAL MALAR CHEEK
LOCATION DETAILED: LEFT INFERIOR CENTRAL MALAR CHEEK

## 2024-07-24 ASSESSMENT — LOCATION SIMPLE DESCRIPTION DERM
LOCATION SIMPLE: LEFT CHEEK
LOCATION SIMPLE: RIGHT CHEEK

## 2024-07-24 ASSESSMENT — LOCATION ZONE DERM: LOCATION ZONE: FACE

## 2024-07-24 NOTE — PROCEDURE: PATIENT SPECIFIC COUNSELING
- Informed patient symptoms have improved since LOV \\n- Advised patient to start using ketoconazole 2% shampoo as face wash 1-2 times daily for 1 month\\n- Recommended patient d/c hydrocortisone 2.5% cream\\n- Informed patient if she experiences no flares in 1 month, she can try d/c ketoconazole 2% shampoo\\n- Recommended patient use ketoconazole 2% shampoo with flares after d/c; only use hydrocortisone 2.5% cream if ketoconazole 2% shampoo isn't resolving symptoms\\n- Informed patient she is ok to call for refills of ketoconazole 2% shampoo if needed\\n- Advised patient to follow up PRN with concerns\\n- Patient was agreeable\\n\\nWith flares:\\n- Use ketoconazole 2% shampoo 1-2 times daily as face wash until clear\\n- Apply hydrocortisone 2.5% cream to affected areas up to twice daily for up to 14 days per month
Detail Level: Simple

## 2024-07-24 NOTE — PROCEDURE: PRESCRIPTION MEDICATION MANAGEMENT
Modify Regimen: Wash face once daily with ketoconazole 2 % shampoo for 1 month. Let shampoo sit for 1-2 minutes prior to rinsing.\\nWith flares, apply hydrocortisone 2.5 % topical cream to affected areas on the face 1-2 times daily until clear or for up to 2 weeks. No more than 14 days monthly
Plan: RTC as needed with concerns.
Detail Level: Zone
Render In Strict Bullet Format?: No

## 2024-08-02 DIAGNOSIS — N95.1 MENOPAUSAL SYNDROME (HOT FLASHES): ICD-10-CM

## 2024-08-02 DIAGNOSIS — F34.1 DYSTHYMIA: ICD-10-CM

## 2024-08-02 RX ORDER — ESTRADIOL 0.03 MG/D
FILM, EXTENDED RELEASE TRANSDERMAL
Qty: 8 PATCH | Refills: 0 | Status: SHIPPED | OUTPATIENT
Start: 2024-08-02 | End: 2024-09-03

## 2024-08-19 NOTE — TELEPHONE ENCOUNTER
SPINE PATIENTS - NEW PROTOCOL PREVISIT    RECORDS RECEIVED FROM: Ekta Metzger MD   REASON FOR VISIT: Memory changes    PROVIDER: Aiden   DATE OF APPT: 08/22/2024   NOTES (FOR ALL VISITS) STATUS DETAILS   OFFICE NOTE from referring provider Internal Referral and notes in chart   NO WORK-UP  No imaging/no prev work-up N/A

## 2024-08-21 ENCOUNTER — TELEPHONE (OUTPATIENT)
Dept: NEUROLOGY | Facility: CLINIC | Age: 84
End: 2024-08-21
Payer: COMMERCIAL

## 2024-08-21 NOTE — TELEPHONE ENCOUNTER
Attempted to reach patient to remind them about appointment scheduled with Timmy Wolfe MD on 8/22/24 in our [place] location.    A voicemail was left with a call back number if the patient has questions or would like to reschedule.

## 2024-08-22 ENCOUNTER — PRE VISIT (OUTPATIENT)
Dept: NEUROLOGY | Facility: CLINIC | Age: 84
End: 2024-08-22

## 2024-09-02 DIAGNOSIS — N95.1 MENOPAUSAL SYNDROME (HOT FLASHES): ICD-10-CM

## 2024-09-02 DIAGNOSIS — F34.1 DYSTHYMIA: ICD-10-CM

## 2024-09-03 RX ORDER — ESTRADIOL 0.03 MG/D
FILM, EXTENDED RELEASE TRANSDERMAL
Qty: 24 PATCH | Refills: 1 | Status: SHIPPED | OUTPATIENT
Start: 2024-09-03

## 2024-09-18 ENCOUNTER — PATIENT OUTREACH (OUTPATIENT)
Dept: CARE COORDINATION | Facility: CLINIC | Age: 84
End: 2024-09-18
Payer: COMMERCIAL

## 2025-02-25 ENCOUNTER — PATIENT OUTREACH (OUTPATIENT)
Dept: CARE COORDINATION | Facility: CLINIC | Age: 85
End: 2025-02-25
Payer: COMMERCIAL

## 2025-03-06 DIAGNOSIS — N95.1 MENOPAUSAL SYNDROME (HOT FLASHES): ICD-10-CM

## 2025-03-06 DIAGNOSIS — F34.1 DYSTHYMIA: ICD-10-CM

## 2025-03-06 RX ORDER — ESTRADIOL 0.03 MG/D
FILM, EXTENDED RELEASE TRANSDERMAL
Qty: 24 PATCH | Refills: 1 | OUTPATIENT
Start: 2025-03-06

## 2025-03-11 ENCOUNTER — PATIENT OUTREACH (OUTPATIENT)
Dept: CARE COORDINATION | Facility: CLINIC | Age: 85
End: 2025-03-11
Payer: COMMERCIAL

## 2025-04-22 ENCOUNTER — TELEPHONE (OUTPATIENT)
Dept: FAMILY MEDICINE | Facility: CLINIC | Age: 85
End: 2025-04-22
Payer: COMMERCIAL

## 2025-04-22 NOTE — TELEPHONE ENCOUNTER
Reason for Call:  Appointment Request    Patient requesting this type of appt:  Preventive     Requested provider: Ekta Solo    Reason patient unable to be scheduled: Not within requested timeframe    When does patient want to be seen/preferred time:  First available    Comments: Patient has Annual scheduled and would like to be put on waiting list if an earlier appointment opens up any day after 2 PM.    Could we send this information to you in [employer] or would you prefer to receive a phone call?:   Patient would like to be contacted via [employer]    Call taken on 4/22/2025 at 12:06 PM by Mandy Fernandez

## 2025-07-10 DIAGNOSIS — N95.1 MENOPAUSAL SYNDROME (HOT FLASHES): ICD-10-CM

## 2025-07-10 DIAGNOSIS — F34.1 DYSTHYMIA: ICD-10-CM

## 2025-07-10 RX ORDER — ESTRADIOL 0.03 MG/D
FILM, EXTENDED RELEASE TRANSDERMAL
Qty: 8 PATCH | Refills: 0 | Status: SHIPPED | OUTPATIENT
Start: 2025-07-10

## 2025-07-13 DIAGNOSIS — N95.1 MENOPAUSAL SYNDROME (HOT FLASHES): ICD-10-CM

## 2025-07-13 DIAGNOSIS — F34.1 DYSTHYMIA: ICD-10-CM

## 2025-07-14 RX ORDER — ESTRADIOL 0.03 MG/D
FILM, EXTENDED RELEASE TRANSDERMAL
Qty: 16 PATCH | Refills: 5 | OUTPATIENT
Start: 2025-07-14

## 2025-07-22 ENCOUNTER — OFFICE VISIT (OUTPATIENT)
Dept: FAMILY MEDICINE | Facility: CLINIC | Age: 85
End: 2025-07-22
Payer: COMMERCIAL

## 2025-07-22 VITALS
WEIGHT: 117 LBS | TEMPERATURE: 98.4 F | HEIGHT: 63 IN | BODY MASS INDEX: 20.73 KG/M2 | RESPIRATION RATE: 16 BRPM | DIASTOLIC BLOOD PRESSURE: 73 MMHG | OXYGEN SATURATION: 98 % | SYSTOLIC BLOOD PRESSURE: 152 MMHG | HEART RATE: 73 BPM

## 2025-07-22 DIAGNOSIS — M85.80 OSTEOPENIA, UNSPECIFIED LOCATION: ICD-10-CM

## 2025-07-22 DIAGNOSIS — Z12.31 VISIT FOR SCREENING MAMMOGRAM: ICD-10-CM

## 2025-07-22 DIAGNOSIS — G31.84 MILD COGNITIVE IMPAIRMENT WITH MEMORY LOSS: ICD-10-CM

## 2025-07-22 DIAGNOSIS — Z23 NEED FOR VACCINATION: ICD-10-CM

## 2025-07-22 DIAGNOSIS — R41.3 MEMORY CHANGE: ICD-10-CM

## 2025-07-22 DIAGNOSIS — N95.1 MENOPAUSAL SYNDROME (HOT FLASHES): ICD-10-CM

## 2025-07-22 DIAGNOSIS — Z00.00 ENCOUNTER FOR MEDICARE ANNUAL WELLNESS EXAM: Primary | ICD-10-CM

## 2025-07-22 DIAGNOSIS — E78.5 HYPERLIPIDEMIA LDL GOAL <160: ICD-10-CM

## 2025-07-22 DIAGNOSIS — F34.1 DYSTHYMIA: ICD-10-CM

## 2025-07-22 DIAGNOSIS — R03.0 ELEVATED BLOOD PRESSURE READING WITHOUT DIAGNOSIS OF HYPERTENSION: ICD-10-CM

## 2025-07-22 DIAGNOSIS — Z13.6 SCREENING FOR CARDIOVASCULAR CONDITION: ICD-10-CM

## 2025-07-22 DIAGNOSIS — Z78.0 ASYMPTOMATIC POSTMENOPAUSAL STATUS: ICD-10-CM

## 2025-07-22 PROCEDURE — 82306 VITAMIN D 25 HYDROXY: CPT | Performed by: FAMILY MEDICINE

## 2025-07-22 PROCEDURE — 85027 COMPLETE CBC AUTOMATED: CPT | Performed by: FAMILY MEDICINE

## 2025-07-22 PROCEDURE — G0439 PPPS, SUBSEQ VISIT: HCPCS | Performed by: FAMILY MEDICINE

## 2025-07-22 PROCEDURE — 84100 ASSAY OF PHOSPHORUS: CPT | Performed by: FAMILY MEDICINE

## 2025-07-22 PROCEDURE — 99214 OFFICE O/P EST MOD 30 MIN: CPT | Mod: 25 | Performed by: FAMILY MEDICINE

## 2025-07-22 PROCEDURE — 3078F DIAST BP <80 MM HG: CPT | Performed by: FAMILY MEDICINE

## 2025-07-22 PROCEDURE — 36415 COLL VENOUS BLD VENIPUNCTURE: CPT | Performed by: FAMILY MEDICINE

## 2025-07-22 PROCEDURE — 3077F SYST BP >= 140 MM HG: CPT | Performed by: FAMILY MEDICINE

## 2025-07-22 PROCEDURE — 80053 COMPREHEN METABOLIC PANEL: CPT | Performed by: FAMILY MEDICINE

## 2025-07-22 RX ORDER — ESTRADIOL 0.03 MG/D
FILM, EXTENDED RELEASE TRANSDERMAL
Qty: 24 PATCH | Refills: 0 | Status: SHIPPED | OUTPATIENT
Start: 2025-07-22

## 2025-07-22 SDOH — HEALTH STABILITY: PHYSICAL HEALTH: ON AVERAGE, HOW MANY MINUTES DO YOU ENGAGE IN EXERCISE AT THIS LEVEL?: 30 MIN

## 2025-07-22 SDOH — HEALTH STABILITY: PHYSICAL HEALTH: ON AVERAGE, HOW MANY DAYS PER WEEK DO YOU ENGAGE IN MODERATE TO STRENUOUS EXERCISE (LIKE A BRISK WALK)?: 3 DAYS

## 2025-07-22 ASSESSMENT — SOCIAL DETERMINANTS OF HEALTH (SDOH): HOW OFTEN DO YOU GET TOGETHER WITH FRIENDS OR RELATIVES?: TWICE A WEEK

## 2025-07-22 NOTE — PROGRESS NOTES
Preventive Care Visit  [facility]  Ekta Solo MD, Family Medicine  Jul 22, 2025  {Provider  Link to SmartSet :936229}    {PROVIDER CHARTING PREFERENCE:597997}    Dave Ybarra is a 85 year old, presenting for the following:  Physical        7/22/2025     3:10 PM   Additional Questions   Roomed by Polly CARDOZA MA   Accompanied by self         7/22/2025     3:10 PM   Patient Reported Additional Medications   Patient reports taking the following new medications none          HPI           Advance Care Planning  {The storyboard will display whether the patient has ACP docs on file. Hover over the Code section in the storyboard to access the ACP documents. :549101}  {(AWV REQUIRED) Advance Care Planning Reviewed:224539}        3/27/2024   General Health   How would you rate your overall physical health? Excellent   Feel stress (tense, anxious, or unable to sleep) Not at all         3/27/2024   Nutrition   Diet: Regular (no restrictions)         3/27/2024   Exercise   Days per week of moderate/strenous exercise 3 days         3/27/2024   Social Factors   Frequency of gathering with friends or relatives Twice a week   Worry food won't last until get money to buy more No   Food not last or not have enough money for food? No   Do you have housing? (Housing is defined as stable permanent housing and does not include staying outside in a car, in a tent, in an abandoned building, in an overnight shelter, or couch-surfing.) Yes   Are you worried about losing your housing? No   Lack of transportation? No   Unable to get utilities (heat,electricity)? No         3/27/2024   Activities of Daily Living- Home Safety   Needs help with the following daily activites None of the above   Safety concerns in the home None of the above         3/27/2024   Dental   Dentist two times every year? Yes         3/27/2024   Hearing Screening   Hearing concerns? None of the above           3/27/2024   Urinary  Incontinence Screening   Bothered by leaking urine in past 6 months No       { Rooming Staff Patient needs a PHQ as part of the AWV.  Use this link to complete and then refresh the note to pull results Link to PHQ9 Assessment :505157}  {USE TO PULL IN PHQ RESULTS FOR TODAY:038300}        3/27/2024   Substance Use   Alcohol more than 3/day or more than 7/wk No   Do you have a current opioid prescription? No   How severe/bad is pain from 1 to 10? 0/10 (No Pain)   Do you use any other substances recreationally? No    (!) PRESCRIPTION DRUGS       Multiple values from one day are sorted in reverse-chronological order     Social History     Tobacco Use    Smoking status: Never    Smokeless tobacco: Never   Vaping Use    Vaping status: Never Used   Substance Use Topics    Alcohol use: Yes     Alcohol/week: 0.0 standard drinks of alcohol     Comment: 1 serving of  wine per day    Drug use: No     {Provider  If there are gaps in the social history shown above, please follow the link to update and then refresh the note Link to Social and Substance History :617003}     {Mammogram Decision Support (Optional):780767}      {Link to Fracture Risk Assessment Tool (Optional):165486}    {Provider  REQUIRED FOR AWV Use the storyboard to review patient history, after sections have been marked as reviewed, refresh note to capture documentation:688041}  {Provider   REQUIRED AWV use this link to review and update sexual activity history  after section has been marked as reviewed, refresh note to capture documentation:021256}  Reviewed and updated as needed this visit by Provider                    {HISTORY OPTIONS (Optional):458053}  Current providers sharing in care for this patient include:  Patient Care Team:  Ekta Solo MD as PCP - General (Family Practice)  Ekta Solo MD as Assigned PCP  Timmy Wolfe MD as MD (Neurology)    The following health maintenance items are reviewed in Epic  "and correct as of today:  Health Maintenance   Topic Date Due    ANNUAL REVIEW OF HM ORDERS  Never done    RSV VACCINE (1 - 1-dose 75+ series) Never done    LIPID  01/24/2021    DEXA  02/03/2023    MAMMO SCREENING  03/22/2024    COVID-19 VACCINE (4 - 2024-25 season) 09/01/2024    PHQ-9  09/27/2024    MEDICARE ANNUAL WELLNESS VISIT  03/27/2025    FALL RISK ASSESSMENT  03/27/2025    INFLUENZA VACCINE (1) 09/01/2025    DTAP/TDAP/TD VACCINE (2 - Td or Tdap) 01/18/2029    ADVANCE CARE PLANNING  04/01/2029    DEPRESSION ACTION PLAN  Completed    PNEUMOCOCCAL VACCINE 50+ YEARS  Completed    ZOSTER VACCINE  Completed    HPV VACCINE  Aged Out    MENINGITIS VACCINE  Aged Out       {ROS Picklists (Optional):790778}     Objective    Exam  LMP  (LMP Unknown)    Estimated body mass index is 19.69 kg/m  as calculated from the following:    Height as of 3/27/24: 1.613 m (5' 3.5\").    Weight as of 3/27/24: 51.2 kg (112 lb 14.4 oz).    Physical Exam  {Exam Choices (Optional):388658}  {Provider  The Mini-Cog is incomplete, use link to complete and refresh note Link to Mini-Cog :331994}      3/27/2024   Mini Cog   Clock Draw Score 0 Abnormal   3 Item Recall 0 objects recalled   Mini Cog Total Score 0     {A Mini-Cog total score of 0-2 suggests the possibility of dementia, score of 3-5 suggests no dementia:511407}         Signed Electronically by: Ekta Solo MD  {Email feedback regarding this note to [email or web address]   :956430}  "

## 2025-07-22 NOTE — PATIENT INSTRUCTIONS
Patient Education   Preventive Care Advice   This is general advice given by our system to help you stay healthy. However, your care team may have specific advice just for you. Please talk to your care team about your preventive care needs.  Nutrition  Eat 5 or more servings of fruits and vegetables each day.  Try wheat bread, brown rice and whole grain pasta (instead of white bread, rice, and pasta).  Get enough calcium and vitamin D. Check the label on foods and aim for 100% of the RDA (recommended daily allowance).  Lifestyle  Exercise at least 150 minutes each week  (30 minutes a day, 5 days a week).  Do muscle strengthening activities 2 days a week. These help control your weight and prevent disease.  No smoking.  Wear sunscreen to prevent skin cancer.  Have a dental exam and cleaning every 6 months.  Yearly exams  See your health care team every year to talk about:  Any changes in your health.  Any medicines your care team has prescribed.  Preventive care, family planning, and ways to prevent chronic diseases.  Shots (vaccines)   HPV shots (up to age 26), if you've never had them before.  Hepatitis B shots (up to age 59), if you've never had them before.  COVID-19 shot: Get this shot when it's due.  Flu shot: Get a flu shot every year.  Tetanus shot: Get a tetanus shot every 10 years.  Pneumococcal, hepatitis A, and RSV shots: Ask your care team if you need these based on your risk.  Shingles shot (for age 50 and up)  General health tests  Diabetes screening:  Starting at age 35, Get screened for diabetes at least every 3 years.  If you are younger than age 35, ask your care team if you should be screened for diabetes.  Cholesterol test: At age 39, start having a cholesterol test every 5 years, or more often if advised.  Bone density scan (DEXA): At age 50, ask your care team if you should have this scan for osteoporosis (brittle bones).  Hepatitis C: Get tested at least once in your life.  STIs (sexually  transmitted infections)  Before age 24: Ask your care team if you should be screened for STIs.  After age 24: Get screened for STIs if you're at risk. You are at risk for STIs (including HIV) if:  You are sexually active with more than one person.  You don't use condoms every time.  You or a partner was diagnosed with a sexually transmitted infection.  If you are at risk for HIV, ask about PrEP medicine to prevent HIV.  Get tested for HIV at least once in your life, whether you are at risk for HIV or not.  Cancer screening tests  Cervical cancer screening: If you have a cervix, begin getting regular cervical cancer screening tests starting at age 21.  Breast cancer scan (mammogram): If you've ever had breasts, begin having regular mammograms starting at age 40. This is a scan to check for breast cancer.  Colon cancer screening: It is important to start screening for colon cancer at age 45.  Have a colonoscopy test every 10 years (or more often if you're at risk) Or, ask your provider about stool tests like a FIT test every year or Cologuard test every 3 years.  To learn more about your testing options, visit:   .  For help making a decision, visit:   https://bit.ly/nw57041.  Prostate cancer screening test: If you have a prostate, ask your care team if a prostate cancer screening test (PSA) at age 55 is right for you.  Lung cancer screening: If you are a current or former smoker ages 50 to 80, ask your care team if ongoing lung cancer screenings are right for you.  For informational purposes only. Not to replace the advice of your health care provider. Copyright   2023 [employer]. All rights reserved. Clinically reviewed by the [facility] Transitions Program. [employer] [id number] - REV 01/24.

## 2025-07-22 NOTE — PROGRESS NOTES
Preventive Care Visit  [facility]  Ekta Solo MD, Family Medicine  Jul 22, 2025  {Provider  Link to [facility] :247140}    {PROVIDER CHARTING PREFERENCE:572375}    Dave Ybarra is a 85 year old, presenting for the following:  Physical        7/22/2025     3:10 PM   Additional Questions   Roomed by Polly CARDOZA MA   Accompanied by self         7/22/2025     3:10 PM   Patient Reported Additional Medications   Patient reports taking the following new medications none          HPI  ***   {MA/LPN/RN Pre-Provider Visit Orders- hCG/UA/Strep (Optional):885730}  {SUPERLIST (Optional):571217}  {additonal problems for provider to add (Optional):446818}  Advance Care Planning  {The storyboard will display whether the patient has ACP docs on file. Hover over the Code section in the storyboard to access the ACP documents. :718190}  {(AWV REQUIRED) Advance Care Planning Reviewed:260509}        3/27/2024   General Health   How would you rate your overall physical health? Excellent   Feel stress (tense, anxious, or unable to sleep) Not at all         3/27/2024   Nutrition   Diet: Regular (no restrictions)         3/27/2024   Exercise   Days per week of moderate/strenous exercise 3 days         3/27/2024   Social Factors   Frequency of gathering with friends or relatives Twice a week   Worry food won't last until get money to buy more No   Food not last or not have enough money for food? No   Do you have housing? (Housing is defined as stable permanent housing and does not include staying outside in a car, in a tent, in an abandoned building, in an overnight shelter, or couch-surfing.) Yes   Are you worried about losing your housing? No   Lack of transportation? No   Unable to get utilities (heat,electricity)? No         3/27/2024   Activities of Daily Living- Home Safety   Needs help with the following daily activites None of the above   Safety concerns in the home None of the above         3/27/2024    Dental   Dentist two times every year? Yes         3/27/2024   Hearing Screening   Hearing concerns? None of the above           3/27/2024   Urinary Incontinence Screening   Bothered by leaking urine in past 6 months No       { Rooming Staff Patient needs a PHQ as part of the AWV.  Use this link to complete and then refresh the note to pull results Link to PHQ9 Assessment :178297}  {USE TO PULL IN PHQ RESULTS FOR TODAY:363977}        3/27/2024   Substance Use   Alcohol more than 3/day or more than 7/wk No   Do you have a current opioid prescription? No   How severe/bad is pain from 1 to 10? 0/10 (No Pain)   Do you use any other substances recreationally? No    (!) PRESCRIPTION DRUGS       Multiple values from one day are sorted in reverse-chronological order     Social History     Tobacco Use    Smoking status: Never    Smokeless tobacco: Never   Vaping Use    Vaping status: Never Used   Substance Use Topics    Alcohol use: Yes     Alcohol/week: 0.0 standard drinks of alcohol     Comment: 1 serving of  wine per day    Drug use: No     {Provider  If there are gaps in the social history shown above, please follow the link to update and then refresh the note Link to Social and Substance History :297579}     {Mammogram Decision Support (Optional):168669}      {Link to Fracture Risk Assessment Tool (Optional):748536}    {Provider  REQUIRED FOR AWV Use the storyboard to review patient history, after sections have been marked as reviewed, refresh note to capture documentation:181717}  {Provider   REQUIRED AWV use this link to review and update sexual activity history  after section has been marked as reviewed, refresh note to capture documentation:336142}  Reviewed and updated as needed this visit by Provider                    {HISTORY OPTIONS (Optional):162415}  Current providers sharing in care for this patient include:  Patient Care Team:  Ekta Solo MD as PCP - General (Family Practice)  [place]  "Ekta Hernández MD as Assigned PCP  Timmy Wolfe MD as MD (Neurology)    The following health maintenance items are reviewed in Epic and correct as of today:  Health Maintenance   Topic Date Due    ANNUAL REVIEW OF HM ORDERS  Never done    RSV VACCINE (1 - 1-dose 75+ series) Never done    LIPID  01/24/2021    DEXA  02/03/2023    MAMMO SCREENING  03/22/2024    COVID-19 VACCINE (4 - 2024-25 season) 09/01/2024    PHQ-9  09/27/2024    MEDICARE ANNUAL WELLNESS VISIT  03/27/2025    FALL RISK ASSESSMENT  03/27/2025    INFLUENZA VACCINE (1) 09/01/2025    DTAP/TDAP/TD VACCINE (2 - Td or Tdap) 01/18/2029    ADVANCE CARE PLANNING  04/01/2029    DEPRESSION ACTION PLAN  Completed    PNEUMOCOCCAL VACCINE 50+ YEARS  Completed    ZOSTER VACCINE  Completed    HPV VACCINE  Aged Out    MENINGITIS VACCINE  Aged Out       {ROS Picklists (Optional):549519}     Objective    Exam  LMP  (LMP Unknown)    Estimated body mass index is 19.69 kg/m  as calculated from the following:    Height as of 3/27/24: 1.613 m (5' 3.5\").    Weight as of 3/27/24: 51.2 kg (112 lb 14.4 oz).    Physical Exam  {Exam Choices (Optional):767519}  {Provider  The Mini-Cog is incomplete, use link to complete and refresh note Link to Mini-Cog :113925}      3/27/2024   Mini Cog   Clock Draw Score 0 Abnormal   3 Item Recall 0 objects recalled   Mini Cog Total Score 0     {A Mini-Cog total score of 0-2 suggests the possibility of dementia, score of 3-5 suggests no dementia:930204}         Signed Electronically by: Ekta Solo MD  {Email feedback regarding this note to [email or web address]   :824677}  "

## 2025-07-22 NOTE — PROGRESS NOTES
Preventive Care Visit  [facility]  Ekta Solo MD, Family Medicine  Jul 22, 2025  {Provider  Link to Smart[employer] :860017}    {PROVIDER CHARTING PREFERENCE:838405}    Dave Ybarra is a 85 year old, presenting for the following:  Physical        7/22/2025     3:10 PM   Additional Questions   Roomed by Polly CARDOZA MA   Accompanied by self         7/22/2025     3:10 PM   Patient Reported Additional Medications   Patient reports taking the following new medications none          HPI           Advance Care Planning  {The storyboard will display whether the patient has ACP docs on file. Hover over the Code section in the storyboard to access the ACP documents. :350106}  {(AWV REQUIRED) Advance Care Planning Reviewed:720923}        7/22/2025   General Health   How would you rate your overall physical health? Good   Feel stress (tense, anxious, or unable to sleep) Not at all         7/22/2025   Nutrition   Diet: Regular (no restrictions)         7/22/2025   Exercise   Days per week of moderate/strenous exercise 3 days   Average minutes spent exercising at this level 30 min         7/22/2025   Social Factors   Frequency of gathering with friends or relatives Twice a week   Worry food won't last until get money to buy more No   Food not last or not have enough money for food? No   Do you have housing? (Housing is defined as stable permanent housing and does not include staying outside in a car, in a tent, in an abandoned building, in an overnight shelter, or couch-surfing.) Yes   Are you worried about losing your housing? No   Lack of transportation? No   Unable to get utilities (heat,electricity)? No         7/22/2025   Fall Risk   Fallen 2 or more times in the past year? No    Trouble with walking or balance? No        Proxy-reported          7/22/2025   Activities of Daily Living- Home Safety   Needs help with the following daily activites None of the above   Safety concerns in the home None  of the above         7/22/2025   Dental   Dentist two times every year? Yes         7/22/2025   Hearing Screening   Hearing concerns? None of the above         7/22/2025   Driving Risk Screening   Patient/family members have concerns about driving No         7/22/2025   General Alertness/Fatigue Screening   Have you been more tired than usual lately? No         7/22/2025   Urinary Incontinence Screening   Bothered by leaking urine in past 6 months No       { Rooming Staff Patient needs a PHQ as part of the AWV.  Use this link to complete and then refresh the note to pull results Link to PHQ9 Assessment :471495}  {USE TO PULL IN PHQ RESULTS FOR TODAY:082487}        7/22/2025   Substance Use   Alcohol more than 3/day or more than 7/wk No   Do you have a current opioid prescription? No   How severe/bad is pain from 1 to 10? 0/10 (No Pain)   Do you use any other substances recreationally? No     Social History     Tobacco Use    Smoking status: Never    Smokeless tobacco: Never   Vaping Use    Vaping status: Never Used   Substance Use Topics    Alcohol use: Yes     Alcohol/week: 0.0 standard drinks of alcohol     Comment: 1 serving of  wine per day    Drug use: No     {Provider  If there are gaps in the social history shown above, please follow the link to update and then refresh the note Link to Social and Substance History :700248}     {Mammogram Decision Support (Optional):553405}      {Link to Fracture Risk Assessment Tool (Optional):035325}    {Provider  REQUIRED FOR AWV Use the storyboard to review patient history, after sections have been marked as reviewed, refresh note to capture documentation:424846}  {Provider   REQUIRED AWV use this link to review and update sexual activity history  after section has been marked as reviewed, refresh note to capture documentation:742796}  Reviewed and updated as needed this visit by Provider                    {HISTORY OPTIONS (Optional):243001}  Current providers sharing  "in care for this patient include:  Patient Care Team:  Ekta Solo MD as PCP - General (Family Practice)  Ekta Solo MD as Assigned PCP  Timmy Wolfe MD as MD (Neurology)    The following health maintenance items are reviewed in Epic and correct as of today:  Health Maintenance   Topic Date Due    ANNUAL REVIEW OF HM ORDERS  Never done    RSV VACCINE (1 - 1-dose 75+ series) Never done    LIPID  01/24/2021    DEXA  02/03/2023    MAMMO SCREENING  03/22/2024    COVID-19 VACCINE (4 - 2024-25 season) 09/01/2024    PHQ-9  09/27/2024    MEDICARE ANNUAL WELLNESS VISIT  03/27/2025    INFLUENZA VACCINE (1) 09/01/2025    FALL RISK ASSESSMENT  07/22/2026    DTAP/TDAP/TD VACCINE (2 - Td or Tdap) 01/18/2029    ADVANCE CARE PLANNING  04/01/2029    DEPRESSION ACTION PLAN  Completed    PNEUMOCOCCAL VACCINE 50+ YEARS  Completed    ZOSTER VACCINE  Completed    HPV VACCINE  Aged Out    MENINGITIS VACCINE  Aged Out       {ROS Picklists (Optional):695032}     Objective    Exam  LMP  (LMP Unknown)    Estimated body mass index is 19.69 kg/m  as calculated from the following:    Height as of 3/27/24: 1.613 m (5' 3.5\").    Weight as of 3/27/24: 51.2 kg (112 lb 14.4 oz).    Physical Exam  {Exam Choices (Optional):887024}  {Provider  The Mini-Cog is incomplete, use link to complete and refresh note Link to Mini-Cog :798531}      3/27/2024   Mini Cog   Clock Draw Score 0 Abnormal   3 Item Recall 0 objects recalled   Mini Cog Total Score 0     {A Mini-Cog total score of 0-2 suggests the possibility of dementia, score of 3-5 suggests no dementia:821068}         Signed Electronically by: Ekta Solo MD  {Email feedback regarding this note to [email or web address]   :443281}  " "recollection of being off the patch for an extended period.  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  - Reports poor vision and lack of depth perception  - No longer drives due to vision problems    Blood pressure  - Reports blood pressure is \"fine at home\" but has not been checking it regularly  - Family has a blood pressure cuff at home but has not been monitoring regularly    Cholesterol  - Avoids eating eggs due to belief of high cholesterol  - Eats Cheerios and oatmeal with fruit daily, believes these are good for cholesterol    Bone health  - Underwent a DEXA scan almost five years ago  - Aware of previous bone density concerns  - No mention of current or past bone pain or fractures      Advance Care Planning    Discussed advance care planning with patient; however, patient declined at this time.        7/22/2025   General Health   How would you rate your overall physical health? Good   Feel stress (tense, anxious, or unable to sleep) Not at all         7/22/2025   Nutrition   Diet: Regular (no restrictions)         7/22/2025   Exercise   Days per week of moderate/strenous exercise 3 days   Average minutes spent exercising at this level 30 min         7/22/2025   Social Factors   Frequency of gathering with friends or relatives Twice a week   Worry food won't last until get money to buy more No   Food not last or not have enough money for food? No   Do you have housing? (Housing is defined as stable permanent housing and does not include staying outside in a car, in a tent, in an abandoned building, in an overnight shelter, or " couch-surfing.) Yes   Are you worried about losing your housing? No   Lack of transportation? No   Unable to get utilities (heat,electricity)? No         7/22/2025   Fall Risk   Fallen 2 or more times in the past year? No    Trouble with walking or balance? No        Proxy-reported          7/22/2025   Activities of Daily Living- Home Safety   Needs help with the following daily activites None of the above   Safety concerns in the home None of the above         7/22/2025   Dental   Dentist two times every year? Yes         7/22/2025   Hearing Screening   Hearing concerns? None of the above         7/22/2025   Driving Risk Screening   Patient/family members have concerns about driving No         7/22/2025   General Alertness/Fatigue Screening   Have you been more tired than usual lately? No         7/22/2025   Urinary Incontinence Screening   Bothered by leaking urine in past 6 months No                 7/22/2025   Substance Use   Alcohol more than 3/day or more than 7/wk No   Do you have a current opioid prescription? No   How severe/bad is pain from 1 to 10? 0/10 (No Pain)   Do you use any other substances recreationally? No     Social History     Tobacco Use    Smoking status: Never    Smokeless tobacco: Never   Vaping Use    Vaping status: Never Used   Substance Use Topics    Alcohol use: Yes     Alcohol/week: 0.0 standard drinks of alcohol     Comment: 1 serving of  wine per day    Drug use: No          Mammogram Screening - After age 74- determine frequency with patient based on health status, life expectancy and patient goals            Reviewed and updated as needed this visit by Provider   Tobacco  Allergies  Meds  Problems  Med Hx  Surg Hx  Fam Hx            Lab work is in process  Labs reviewed in EPIC  BP Readings from Last 3 Encounters:   07/22/25 (!) 152/73   03/27/24 (!) 168/82   08/19/22 (!) 141/64    Wt Readings from Last 3 Encounters:   07/22/25 53.1 kg (117 lb)   03/27/24 51.2 kg (112 lb  "14.4 oz)   08/19/22 52.3 kg (115 lb 4.8 oz)                  Recent Labs   Lab Test 07/22/25  1630 03/27/24  1057 01/24/20  1141 01/18/19  1246 01/12/18  1233   LDL  --   --  178* 174* 156*   HDL  --   --  74 79 85   TRIG  --   --  89 94 106   ALT 17  --   --   --   --    CR 0.70 0.82  --   --   --    GFRESTIMATED 85 71  --   --   --    POTASSIUM 4.4 4.2  --   --   --       Current providers sharing in care for this patient include:  Patient Care Team:  Ekta Solo MD as PCP - General (Family Practice)  Ekta Solo MD as Assigned PCP  Timmy Wolfe MD as MD (Neurology)    The following health maintenance items are reviewed in Epic and correct as of today:  Health Maintenance   Topic Date Due    ANNUAL REVIEW OF HM ORDERS  Never done    RSV VACCINE (1 - 1-dose 75+ series) Never done    LIPID  01/24/2021    DEXA  02/03/2023    MAMMO SCREENING  03/22/2024    COVID-19 VACCINE (4 - 2024-25 season) 09/01/2024    PHQ-9  09/27/2024    INFLUENZA VACCINE (1) 09/01/2025    MEDICARE ANNUAL WELLNESS VISIT  07/22/2026    FALL RISK ASSESSMENT  07/22/2026    DTAP/TDAP/TD VACCINE (2 - Td or Tdap) 01/18/2029    ADVANCE CARE PLANNING  04/01/2029    DEPRESSION ACTION PLAN  Completed    PNEUMOCOCCAL VACCINE 50+ YEARS  Completed    HPV VACCINE (No Doses Required) Completed    ZOSTER VACCINE  Completed    MENINGITIS VACCINE  Aged Out         Review of Systems  Constitutional, neuro, ENT, endocrine, pulmonary, cardiac, gastrointestinal, genitourinary, musculoskeletal, integument and psychiatric systems are negative, except as otherwise noted.     Objective    Exam  BP (!) 152/73 (BP Location: Left arm, Patient Position: Sitting, Cuff Size: Adult Regular)   Pulse 73   Temp 98.4  F (36.9  C) (Oral)   Resp 16   Ht 1.6 m (5' 3\")   Wt 53.1 kg (117 lb)   LMP  (LMP Unknown)   SpO2 98%   BMI 20.73 kg/m     Estimated body mass index is 20.73 kg/m  as calculated from the following:    Height as of " "this encounter: 1.6 m (5' 3\").    Weight as of this encounter: 53.1 kg (117 lb).    Physical Exam  GENERAL: alert and no distress  EYES: Eyes grossly normal to inspection, PERRL and conjunctivae and sclerae normal  HENT: ear canals and TM's normal, nose and mouth without ulcers or lesions  NECK: no adenopathy, no asymmetry, masses, or scars  RESP: lungs clear to auscultation - no rales, rhonchi or wheezes  CV: regular rate and rhythm, normal S1 S2, no S3 or S4, no murmur, click or rub, no peripheral edema  ABDOMEN: soft, nontender, no hepatosplenomegaly, no masses and bowel sounds normal  MS: no gross musculoskeletal defects noted, no edema  SKIN: no suspicious lesions or rashes  NEURO: Normal strength and tone, mentation intact and speech normal  PSYCH: mentation appears normal, affect normal/bright        3/27/2024   Mini Cog   Clock Draw Score 0 Abnormal   3 Item Recall 0 objects recalled   Mini Cog Total Score 0              Signed Electronically by: Ekta Solo MD    "

## 2025-07-23 ENCOUNTER — PATIENT OUTREACH (OUTPATIENT)
Dept: CARE COORDINATION | Facility: CLINIC | Age: 85
End: 2025-07-23
Payer: COMMERCIAL

## 2025-07-23 LAB
ERYTHROCYTE [DISTWIDTH] IN BLOOD BY AUTOMATED COUNT: 12.2 % (ref 10–15)
HCT VFR BLD AUTO: 39.7 % (ref 35–47)
HGB BLD-MCNC: 13.3 G/DL (ref 11.7–15.7)
MCH RBC QN AUTO: 31.3 PG (ref 26.5–33)
MCHC RBC AUTO-ENTMCNC: 33.5 G/DL (ref 31.5–36.5)
MCV RBC AUTO: 93 FL (ref 78–100)
PLATELET # BLD AUTO: 265 10E3/UL (ref 150–450)
RBC # BLD AUTO: 4.25 10E6/UL (ref 3.8–5.2)
WBC # BLD AUTO: 8.7 10E3/UL (ref 4–11)

## 2025-07-24 LAB
ALBUMIN SERPL BCG-MCNC: 4.5 G/DL (ref 3.5–5.2)
ALP SERPL-CCNC: 62 U/L (ref 40–150)
ALT SERPL W P-5'-P-CCNC: 17 U/L (ref 0–50)
ANION GAP SERPL CALCULATED.3IONS-SCNC: 13 MMOL/L (ref 7–15)
AST SERPL W P-5'-P-CCNC: 33 U/L (ref 0–45)
BILIRUB SERPL-MCNC: 0.3 MG/DL
BUN SERPL-MCNC: 10.7 MG/DL (ref 8–23)
CALCIUM SERPL-MCNC: 9.5 MG/DL (ref 8.8–10.4)
CHLORIDE SERPL-SCNC: 102 MMOL/L (ref 98–107)
CREAT SERPL-MCNC: 0.7 MG/DL (ref 0.51–0.95)
EGFRCR SERPLBLD CKD-EPI 2021: 85 ML/MIN/1.73M2
GLUCOSE SERPL-MCNC: 83 MG/DL (ref 70–99)
HCO3 SERPL-SCNC: 23 MMOL/L (ref 22–29)
PHOSPHATE SERPL-MCNC: 3.6 MG/DL (ref 2.5–4.5)
POTASSIUM SERPL-SCNC: 4.4 MMOL/L (ref 3.4–5.3)
PROT SERPL-MCNC: 7.2 G/DL (ref 6.4–8.3)
SODIUM SERPL-SCNC: 138 MMOL/L (ref 135–145)
VIT D+METAB SERPL-MCNC: 92 NG/ML (ref 20–50)

## 2025-07-28 ENCOUNTER — RESULTS FOLLOW-UP (OUTPATIENT)
Dept: FAMILY MEDICINE | Facility: CLINIC | Age: 85
End: 2025-07-28
Payer: COMMERCIAL

## 2025-08-12 ENCOUNTER — HOSPITAL ENCOUNTER (OUTPATIENT)
Dept: BONE DENSITY | Facility: CLINIC | Age: 85
Discharge: HOME | End: 2025-08-12
Attending: FAMILY MEDICINE
Payer: COMMERCIAL

## 2025-08-12 DIAGNOSIS — Z78.0 ASYMPTOMATIC POSTMENOPAUSAL STATUS: ICD-10-CM

## 2025-08-12 PROCEDURE — 77080 DXA BONE DENSITY AXIAL: CPT

## 2025-08-18 ENCOUNTER — MYC MEDICAL ADVICE (OUTPATIENT)
Dept: FAMILY MEDICINE | Facility: CLINIC | Age: 85
End: 2025-08-18
Payer: COMMERCIAL

## 2025-08-18 ENCOUNTER — TELEPHONE (OUTPATIENT)
Dept: FAMILY MEDICINE | Facility: CLINIC | Age: 85
End: 2025-08-18
Payer: COMMERCIAL

## 2025-09-02 ENCOUNTER — OFFICE VISIT (OUTPATIENT)
Dept: FAMILY MEDICINE | Facility: CLINIC | Age: 85
End: 2025-09-02
Payer: COMMERCIAL

## 2025-09-02 VITALS
HEART RATE: 102 BPM | RESPIRATION RATE: 19 BRPM | WEIGHT: 113 LBS | BODY MASS INDEX: 20.02 KG/M2 | OXYGEN SATURATION: 97 % | DIASTOLIC BLOOD PRESSURE: 62 MMHG | HEIGHT: 63 IN | TEMPERATURE: 98.2 F | SYSTOLIC BLOOD PRESSURE: 144 MMHG

## 2025-09-02 DIAGNOSIS — N95.1 MENOPAUSAL SYNDROME (HOT FLASHES): ICD-10-CM

## 2025-09-02 DIAGNOSIS — M85.852 OSTEOPENIA OF NECK OF LEFT FEMUR: Primary | ICD-10-CM

## 2025-09-02 DIAGNOSIS — G31.84 MILD COGNITIVE IMPAIRMENT WITH MEMORY LOSS: ICD-10-CM

## 2025-09-02 DIAGNOSIS — R79.89 HIGH SERUM VITAMIN D: ICD-10-CM

## 2025-09-02 PROCEDURE — 3077F SYST BP >= 140 MM HG: CPT | Performed by: FAMILY MEDICINE

## 2025-09-02 PROCEDURE — 99215 OFFICE O/P EST HI 40 MIN: CPT | Performed by: FAMILY MEDICINE

## 2025-09-02 PROCEDURE — 1126F AMNT PAIN NOTED NONE PRSNT: CPT | Performed by: FAMILY MEDICINE

## 2025-09-02 PROCEDURE — 3078F DIAST BP <80 MM HG: CPT | Performed by: FAMILY MEDICINE

## 2025-09-02 RX ORDER — ALENDRONATE SODIUM 70 MG/1
70 TABLET ORAL
Qty: 12 TABLET | Refills: 3 | Status: SHIPPED | OUTPATIENT
Start: 2025-09-02

## 2025-09-02 ASSESSMENT — ANXIETY QUESTIONNAIRES
6. BECOMING EASILY ANNOYED OR IRRITABLE: NOT AT ALL
7. FEELING AFRAID AS IF SOMETHING AWFUL MIGHT HAPPEN: NOT AT ALL
4. TROUBLE RELAXING: NOT AT ALL
2. NOT BEING ABLE TO STOP OR CONTROL WORRYING: NOT AT ALL
GAD7 TOTAL SCORE: 0
1. FEELING NERVOUS, ANXIOUS, OR ON EDGE: NOT AT ALL
7. FEELING AFRAID AS IF SOMETHING AWFUL MIGHT HAPPEN: NOT AT ALL
3. WORRYING TOO MUCH ABOUT DIFFERENT THINGS: NOT AT ALL
5. BEING SO RESTLESS THAT IT IS HARD TO SIT STILL: NOT AT ALL
8. IF YOU CHECKED OFF ANY PROBLEMS, HOW DIFFICULT HAVE THESE MADE IT FOR YOU TO DO YOUR WORK, TAKE CARE OF THINGS AT HOME, OR GET ALONG WITH OTHER PEOPLE?: NOT DIFFICULT AT ALL
IF YOU CHECKED OFF ANY PROBLEMS ON THIS QUESTIONNAIRE, HOW DIFFICULT HAVE THESE PROBLEMS MADE IT FOR YOU TO DO YOUR WORK, TAKE CARE OF THINGS AT HOME, OR GET ALONG WITH OTHER PEOPLE: NOT DIFFICULT AT ALL

## 2025-09-02 ASSESSMENT — PATIENT HEALTH QUESTIONNAIRE - PHQ9
SUM OF ALL RESPONSES TO PHQ QUESTIONS 1-9: 0
SUM OF ALL RESPONSES TO PHQ QUESTIONS 1-9: 0

## 2025-09-02 ASSESSMENT — PAIN SCALES - GENERAL: PAINLEVEL_OUTOF10: NO PAIN (0)

## (undated) DEVICE — GLOVE PROTEXIS MICRO 8.5  2D73PM85

## (undated) DEVICE — LINEN TOWEL PACK X5 5464

## (undated) DEVICE — EYE CANN IRR 25GA CYSTOTOME 581610

## (undated) DEVICE — TAPE MICROPORE 2"X1.5YD 1530S-2

## (undated) DEVICE — PACK CATARACT CUSTOM SO DALE SEY32CTFCX

## (undated) DEVICE — EYE KNIFE VISITEC 1.1MM ANG 45DEG SIDEPORT 373710

## (undated) DEVICE — EYE SOL BSS 500ML

## (undated) DEVICE — GLOVE PROTEXIS MICRO 7.0  2D73PM70

## (undated) DEVICE — EYE SHIELD PLASTIC

## (undated) DEVICE — GLOVE PROTEXIS MICRO 8.0  2D73PM80

## (undated) DEVICE — EYE SOL BSS 15ML BOTTLE 65079515

## (undated) DEVICE — EYE KNIFE SLIT XSTAR VISITEC 2.6MM 45DEG 373726

## (undated) DEVICE — CHANG HYDRO

## (undated) DEVICE — EYE PACK CUSTOM ANTERIOR 30DEG TIP CENTURION PPK6682-04

## (undated) DEVICE — Device

## (undated) DEVICE — EYE PACK BVI READYPAK KIT #1

## (undated) RX ORDER — FENTANYL CITRATE 50 UG/ML
INJECTION, SOLUTION INTRAMUSCULAR; INTRAVENOUS
Status: DISPENSED
Start: 2018-08-14

## (undated) RX ORDER — PREDNISOLONE ACETATE 10 MG/ML
SUSPENSION/ DROPS OPHTHALMIC
Status: DISPENSED
Start: 2018-08-14

## (undated) RX ORDER — OFLOXACIN 3 MG/ML
SOLUTION/ DROPS OPHTHALMIC
Status: DISPENSED
Start: 2018-08-14

## (undated) RX ORDER — ONDANSETRON 2 MG/ML
INJECTION INTRAMUSCULAR; INTRAVENOUS
Status: DISPENSED
Start: 2018-08-14

## (undated) RX ORDER — LIDOCAINE HYDROCHLORIDE 20 MG/ML
INJECTION, SOLUTION EPIDURAL; INFILTRATION; INTRACAUDAL; PERINEURAL
Status: DISPENSED
Start: 2018-08-14

## (undated) RX ORDER — PROPOFOL 10 MG/ML
INJECTION, EMULSION INTRAVENOUS
Status: DISPENSED
Start: 2018-08-14

## (undated) RX ORDER — LIDOCAINE HYDROCHLORIDE 10 MG/ML
INJECTION, SOLUTION EPIDURAL; INFILTRATION; INTRACAUDAL; PERINEURAL
Status: DISPENSED
Start: 2018-08-14